# Patient Record
Sex: FEMALE | Race: WHITE | NOT HISPANIC OR LATINO | URBAN - METROPOLITAN AREA
[De-identification: names, ages, dates, MRNs, and addresses within clinical notes are randomized per-mention and may not be internally consistent; named-entity substitution may affect disease eponyms.]

---

## 2018-03-01 ENCOUNTER — OFFICE (AMBULATORY)
Dept: URBAN - METROPOLITAN AREA CLINIC 64 | Facility: CLINIC | Age: 63
End: 2018-03-01
Payer: COMMERCIAL

## 2018-03-01 VITALS
HEART RATE: 75 BPM | WEIGHT: 262 LBS | DIASTOLIC BLOOD PRESSURE: 68 MMHG | HEIGHT: 65 IN | SYSTOLIC BLOOD PRESSURE: 115 MMHG

## 2018-03-01 DIAGNOSIS — K59.00 CONSTIPATION, UNSPECIFIED: ICD-10-CM

## 2018-03-01 DIAGNOSIS — R11.2 NAUSEA WITH VOMITING, UNSPECIFIED: ICD-10-CM

## 2018-03-01 DIAGNOSIS — R14.0 ABDOMINAL DISTENSION (GASEOUS): ICD-10-CM

## 2018-03-01 DIAGNOSIS — K21.9 GASTRO-ESOPHAGEAL REFLUX DISEASE WITHOUT ESOPHAGITIS: ICD-10-CM

## 2018-03-01 DIAGNOSIS — R14.2 ERUCTATION: ICD-10-CM

## 2018-03-01 PROCEDURE — 99213 OFFICE O/P EST LOW 20 MIN: CPT | Performed by: NURSE PRACTITIONER

## 2018-03-01 RX ORDER — OMEPRAZOLE 40 MG/1
40 CAPSULE, DELAYED RELEASE ORAL
Qty: 90 | Refills: 4 | Status: COMPLETED
Start: 2018-03-01 | End: 2020-09-17

## 2018-03-01 RX ORDER — POLYETHYLENE GLYCOL 3350 17 G/17G
17 POWDER, FOR SOLUTION ORAL
Qty: 1 | Refills: 11 | Status: COMPLETED
Start: 2018-03-01 | End: 2019-04-04

## 2018-06-01 ENCOUNTER — HOSPITAL ENCOUNTER (OUTPATIENT)
Dept: CARDIOLOGY | Facility: HOSPITAL | Age: 63
Discharge: HOME | End: 2018-06-01
Attending: INTERNAL MEDICINE | Admitting: INTERNAL MEDICINE

## 2018-06-21 ENCOUNTER — HOSPITAL ENCOUNTER (OUTPATIENT)
Dept: CARDIOLOGY | Facility: HOSPITAL | Age: 63
Discharge: HOME | End: 2018-06-21
Attending: INTERNAL MEDICINE | Admitting: INTERNAL MEDICINE

## 2018-07-09 ENCOUNTER — HOSPITAL ENCOUNTER (OUTPATIENT)
Dept: CARDIOLOGY | Facility: HOSPITAL | Age: 63
Discharge: HOME | End: 2018-07-09

## 2018-08-16 ENCOUNTER — HOSPITAL ENCOUNTER (OUTPATIENT)
Dept: PREADMISSION TESTING | Facility: HOSPITAL | Age: 63
Discharge: HOME | End: 2018-08-16
Attending: INTERNAL MEDICINE | Admitting: INTERNAL MEDICINE

## 2018-08-16 LAB
ANION GAP SERPL CALC-SCNC: 11 MMOL/L (ref 10–20)
APTT BLD: 24.9 SEC (ref 24–31)
BASOPHILS # BLD AUTO: 0 10*3/UL (ref 0–0.2)
BASOPHILS NFR BLD AUTO: 1 % (ref 0–2)
BUN SERPL-MCNC: 15 MG/DL (ref 8–20)
BUN/CREAT SERPL: 15 (ref 5.4–26.2)
CALCIUM SERPL-MCNC: 9.1 MG/DL (ref 8.9–10.3)
CHLORIDE SERPL-SCNC: 100 MMOL/L (ref 101–111)
CONV CO2: 31 MMOL/L (ref 22–32)
CREAT UR-MCNC: 1 MG/DL (ref 0.4–1)
DIFFERENTIAL METHOD BLD: (no result)
EOSINOPHIL # BLD AUTO: 0.4 10*3/UL (ref 0–0.3)
EOSINOPHIL # BLD AUTO: 6 % (ref 0–3)
ERYTHROCYTE [DISTWIDTH] IN BLOOD BY AUTOMATED COUNT: 18.1 % (ref 11.5–14.5)
GLUCOSE SERPL-MCNC: 205 MG/DL (ref 65–99)
HCT VFR BLD AUTO: 36.5 % (ref 35–49)
HGB BLD-MCNC: 11.7 G/DL (ref 12–15)
INR PPP: 1
LYMPHOCYTES # BLD AUTO: 1.6 10*3/UL (ref 0.8–4.8)
LYMPHOCYTES NFR BLD AUTO: 20 % (ref 18–42)
MCH RBC QN AUTO: 25.7 PG (ref 26–32)
MCHC RBC AUTO-ENTMCNC: 32 G/DL (ref 32–36)
MCV RBC AUTO: 80.3 FL (ref 80–94)
MONOCYTES # BLD AUTO: 0.5 10*3/UL (ref 0.1–1.3)
MONOCYTES NFR BLD AUTO: 7 % (ref 2–11)
NEUTROPHILS # BLD AUTO: 5.2 10*3/UL (ref 2.3–8.6)
NEUTROPHILS NFR BLD AUTO: 66 % (ref 50–75)
NRBC BLD AUTO-RTO: 0 /100{WBCS}
NRBC/RBC NFR BLD MANUAL: 0 10*3/UL
PLATELET # BLD AUTO: 208 10*3/UL (ref 150–450)
PMV BLD AUTO: 8.3 FL (ref 7.4–10.4)
POTASSIUM SERPL-SCNC: 4 MMOL/L (ref 3.6–5.1)
PROTHROMBIN TIME: 10.4 SEC (ref 9.6–11.7)
RBC # BLD AUTO: 4.54 10*6/UL (ref 4–5.4)
SODIUM SERPL-SCNC: 138 MMOL/L (ref 136–144)
WBC # BLD AUTO: 7.8 10*3/UL (ref 4.5–11.5)

## 2018-10-30 ENCOUNTER — HOSPITAL ENCOUNTER (OUTPATIENT)
Dept: ORTHOPEDIC SURGERY | Facility: CLINIC | Age: 63
Discharge: HOME | End: 2018-10-30
Attending: PHYSICIAN ASSISTANT | Admitting: PHYSICIAN ASSISTANT

## 2018-12-26 ENCOUNTER — HOSPITAL ENCOUNTER (OUTPATIENT)
Dept: MRI IMAGING | Facility: HOSPITAL | Age: 63
Discharge: HOME | End: 2018-12-26
Attending: PHYSICIAN ASSISTANT | Admitting: PHYSICIAN ASSISTANT

## 2019-01-09 ENCOUNTER — HOSPITAL ENCOUNTER (OUTPATIENT)
Dept: MRI IMAGING | Facility: HOSPITAL | Age: 64
Discharge: HOME | End: 2019-01-09
Attending: PHYSICIAN ASSISTANT | Admitting: PHYSICIAN ASSISTANT

## 2019-01-15 ENCOUNTER — HOSPITAL ENCOUNTER (OUTPATIENT)
Dept: ORTHOPEDIC SURGERY | Facility: CLINIC | Age: 64
Discharge: HOME | End: 2019-01-15
Attending: PHYSICIAN ASSISTANT | Admitting: PHYSICIAN ASSISTANT

## 2019-01-16 ENCOUNTER — HOSPITAL ENCOUNTER (OUTPATIENT)
Dept: PAIN MEDICINE | Facility: HOSPITAL | Age: 64
Discharge: HOME | End: 2019-01-16
Attending: ANESTHESIOLOGY | Admitting: ANESTHESIOLOGY

## 2019-01-25 ENCOUNTER — HOSPITAL ENCOUNTER (OUTPATIENT)
Dept: GENERAL RADIOLOGY | Facility: HOSPITAL | Age: 64
Discharge: HOME | End: 2019-01-25
Attending: PHYSICIAN ASSISTANT | Admitting: PHYSICIAN ASSISTANT

## 2019-02-07 ENCOUNTER — HOSPITAL ENCOUNTER (OUTPATIENT)
Dept: PAIN MEDICINE | Facility: HOSPITAL | Age: 64
Discharge: HOME | End: 2019-02-07
Attending: ANESTHESIOLOGY | Admitting: ANESTHESIOLOGY

## 2019-04-03 ENCOUNTER — HOSPITAL ENCOUNTER (OUTPATIENT)
Dept: ORTHOPEDIC SURGERY | Facility: CLINIC | Age: 64
Discharge: HOME | End: 2019-04-03
Attending: PODIATRIST | Admitting: PODIATRIST

## 2019-04-04 ENCOUNTER — OFFICE (AMBULATORY)
Dept: URBAN - METROPOLITAN AREA CLINIC 64 | Facility: CLINIC | Age: 64
End: 2019-04-04
Payer: COMMERCIAL

## 2019-04-04 ENCOUNTER — HOSPITAL ENCOUNTER (OUTPATIENT)
Dept: ORTHOPEDIC SURGERY | Facility: CLINIC | Age: 64
Discharge: HOME | End: 2019-04-04
Attending: PHYSICIAN ASSISTANT | Admitting: PHYSICIAN ASSISTANT

## 2019-04-04 VITALS — HEIGHT: 65 IN

## 2019-04-04 DIAGNOSIS — R13.19 OTHER DYSPHAGIA: ICD-10-CM

## 2019-04-04 PROCEDURE — 99213 OFFICE O/P EST LOW 20 MIN: CPT | Performed by: NURSE PRACTITIONER

## 2019-04-04 RX ORDER — OMEPRAZOLE 40 MG/1
40 CAPSULE, DELAYED RELEASE ORAL
Qty: 90 | Refills: 4 | Status: COMPLETED
Start: 2018-03-01 | End: 2020-09-17

## 2019-04-10 ENCOUNTER — ON CAMPUS - OUTPATIENT (AMBULATORY)
Dept: URBAN - METROPOLITAN AREA HOSPITAL 2 | Facility: HOSPITAL | Age: 64
End: 2019-04-10
Payer: COMMERCIAL

## 2019-04-10 ENCOUNTER — OFFICE (AMBULATORY)
Dept: URBAN - METROPOLITAN AREA PATHOLOGY 4 | Facility: PATHOLOGY | Age: 64
End: 2019-04-10
Payer: COMMERCIAL

## 2019-04-10 VITALS
HEART RATE: 81 BPM | TEMPERATURE: 97.4 F | SYSTOLIC BLOOD PRESSURE: 121 MMHG | OXYGEN SATURATION: 96 % | HEART RATE: 78 BPM | RESPIRATION RATE: 16 BRPM | SYSTOLIC BLOOD PRESSURE: 125 MMHG | RESPIRATION RATE: 20 BRPM | WEIGHT: 263.8 LBS | OXYGEN SATURATION: 98 % | DIASTOLIC BLOOD PRESSURE: 67 MMHG | RESPIRATION RATE: 18 BRPM | DIASTOLIC BLOOD PRESSURE: 82 MMHG | OXYGEN SATURATION: 100 % | HEIGHT: 65 IN | OXYGEN SATURATION: 99 % | DIASTOLIC BLOOD PRESSURE: 63 MMHG | HEART RATE: 77 BPM | SYSTOLIC BLOOD PRESSURE: 146 MMHG | SYSTOLIC BLOOD PRESSURE: 162 MMHG | DIASTOLIC BLOOD PRESSURE: 86 MMHG | HEART RATE: 82 BPM | OXYGEN SATURATION: 97 % | DIASTOLIC BLOOD PRESSURE: 80 MMHG | HEART RATE: 84 BPM

## 2019-04-10 DIAGNOSIS — R13.19 OTHER DYSPHAGIA: ICD-10-CM

## 2019-04-10 LAB
GI HISTOLOGY: A. SELECT: (no result)
GI HISTOLOGY: PDF REPORT: (no result)

## 2019-04-10 PROCEDURE — 43450 DILATE ESOPHAGUS 1/MULT PASS: CPT | Performed by: INTERNAL MEDICINE

## 2019-04-10 PROCEDURE — 43239 EGD BIOPSY SINGLE/MULTIPLE: CPT | Performed by: INTERNAL MEDICINE

## 2019-04-10 PROCEDURE — 88305 TISSUE EXAM BY PATHOLOGIST: CPT | Performed by: INTERNAL MEDICINE

## 2019-04-11 ENCOUNTER — HOSPITAL ENCOUNTER (OUTPATIENT)
Dept: PAIN MEDICINE | Facility: HOSPITAL | Age: 64
Discharge: HOME | End: 2019-04-11
Attending: ANESTHESIOLOGY | Admitting: ANESTHESIOLOGY

## 2019-05-24 ENCOUNTER — HOSPITAL ENCOUNTER (OUTPATIENT)
Dept: PAIN MEDICINE | Facility: HOSPITAL | Age: 64
Discharge: HOME | End: 2019-05-24
Attending: ANESTHESIOLOGY | Admitting: ANESTHESIOLOGY

## 2019-05-24 ENCOUNTER — CONVERSION ENCOUNTER (OUTPATIENT)
Dept: PAIN MEDICINE | Facility: CLINIC | Age: 64
End: 2019-05-24

## 2019-06-04 VITALS
HEART RATE: 72 BPM | WEIGHT: 260 LBS | SYSTOLIC BLOOD PRESSURE: 167 MMHG | HEIGHT: 64 IN | OXYGEN SATURATION: 72 % | BODY MASS INDEX: 44.39 KG/M2 | RESPIRATION RATE: 16 BRPM | DIASTOLIC BLOOD PRESSURE: 84 MMHG

## 2019-06-06 NOTE — PROGRESS NOTES
HPI: CC Lower back pain    62-year-old female with lumbar radiculitis, S/P  ACDF of C4-C7 corpectomy of C6 2019, Dr. Coburn, here for follow-up after repeat LESI.    Reports 70% relief  Chronic back pain radiating to bilateral lower extremity with burning tingling numbness in both legs worse with standing walking or activity.    Chronic  neck pain radiating to bilateral shoulder bilateral upper extremity.  Mild difficulty with balance and fine motor skills, denies bladder or bowel incontinence.      Tried physical therapy without relief   tried NSAID/tylenol/muscle relaxer with marginal relief   utilizes Lyrica by PCP with mild relief.      L-spine MRI 2018: Severe spinal stenosis at L2-L3, L3-L4, and L4-L5. Neural foraminal narrowing with varying degrees of nerve root impingement    C-spine x-ray 2019: Postsurgical changes of the cervical spine from C4 through C7 .  Hardware appears intact.  Satisfactory vertebral body alignment    Referring MD: JOSE MANUEL Bermudez  Age: 63 Years Old  Sex: Female  Race: White    Pain Assessment   Location of Pain: Neck, R Shoulder, L Shoulder, R Wrist/Arm, L Wrist/Arm, Lower Back, R Hip, L Hip, R Leg, L Leg  Description of Pain: Dull/Aching, Throbbing, Pins & Needles, Stabbing  Previous Pain Rating : 6  Current Pain Ratin  Aggravating Factors: Activity  Alleviating Factors: Rest  Previous Treatments: Epidural Steroids, Narcotic Pain Medication, Physical Therapy  Previous Diagnostic Studies: X-Ray, MRI  Last Dose Pain medicine No narcotic meds  Have your bowel habits changed since you started taking pain medication? No  Comments: takes stool softner  Epidural History Epidurals, last one helped with sciatica      Past Medical History:     Reviewed history from 2019 and no changes required:        Arthritis        Diabetes, Type 2        Hypertension        Obesity        Hx: Sinus Arrhythmia         NURIA - Moderate to severe        Cellulitis : left leg / 2018         Calcium Score 18 2716        Coronary Heart Disease        Pinched nerve         Neck surgery 19    Past Surgical History:     Reviewed history from 2019 and no changes required:        Eye Surgery Right Eye -         Carpal Tunnel Release  Right / Left         Tonsillectomy        Knee Arthroscopy         left knee replacement 2016        Rt knee replacement 2017        Lap Band / Followed by Band Removal         Heart Catherization: BHF -18- significant epicardial coronary artery calcifications with only limited luminal compromise involving a small obtuse marginal branch. Medical management at this time:         post op 19 ACDF C4-C7, sabine C6 - Dr Woodson    Family History Summary:      Reviewed history Last on 2019 and no changes required:2019  Brother - Has Family History of Hypertension - Brother has HTN  - Entered On: 2015  Mother - Has Family History of Heart Disease - Mother had PCI / stents  - Entered On: 2015    General Comments - FH:   denies family history of neck and back problems    Social History:     Reviewed history from 2019 and no changes required:        Marital Status:         Children: 5 -         Occupation: Employed         Passive smoke exposure - no        Alcohol Use - no        Drug Use - no        HIV/High Risk - no        Smoking History:        Patient is a former smoker.        Vital Signs:    Patient Profile:    63 Years Old Female  CC:         Lower back  Height:     64 inches  Weight:     260 pounds  BMI:        44.62     O2 Sat:     72 %  Temp:       98.3 degrees F  Pulse rate: 72 / minute  Resp:       16 per minute  BP Sittin / 84    Patient has a risk of falls? No    Problems: Active problems were reviewed with the patient during this visit.  Medications: Medications were reviewed with the patient during this visit.  Allergies: Allergies were reviewed with the patient during this visit.        Vitals Entered By:  Lori Ang (May 24, 2019 10:55 AM)      Risk Factors:     Smoked Tobacco Use:  Former smoker     Cigarettes:  Yes        Years Since Last Quit:  34    Previous Tobacco Use: Signed On - 04/11/2019  Smoked Tobacco Use:  Former smoker     Cigarettes:  Yes -- 1 pack(s) per day,      Year started:  age 21         Year quit:  1985        Years Since Last Quit:  34 years, 4 months, 23 days  Smokeless Tobacco Use:  Never  Passive smoke exposure:  no  Drug use:  no  HIV high-risk behavior:  no  Caffeine use:  4+ drinks per day    Previous Alcohol Use: Signed On - 04/04/2019  Alcohol use:  no  Exercise:  no  Seatbelt use:  100 %  Sun Exposure:  occasionally    Family History Risk Factors:     Family History of MI in females < 65 years old:  no     Family History of MI in males < 55 years old:  no    Colonoscopy History:     Date of Last Colonoscopy:  01/01/2010    Mammogram History:     Date of Last Mammogram:  01/01/2015          Review of Systems        See HPI    General       Denies fever/chills, fatigue and sleep problems.    Eyes       Denies blurry vision and double vision.    ENT       Denies decreased hearing, sore throat and ears ringing.    CV       Denies chest pain and fainting.    Resp       Denies shortness of breath and cough.    GI       Denies heartburn, constipation, nausea, vomiting and diarrhea.           Denies pain on urination, incontinence and increased frequency.    MS        back pain, bilateral upper extremity pain, bilateral lower extremity pain, neck pain    Derm       Denies rash and itching.    Neuro       Denies numbness, tingling, loss of balance and history of seizures.    Psych       Denies anxiety and depression.    Endo       Denies weight change and thirsty all the time.    Heme       Denies easy bruising, bleeding and enlarged lymph nodes.      Physical Exam   General  General appearance: obese,  no acute distress  Gait and station: antalgic    Mental Status Exam   Mental Status:  AAO x3  Behavior: Appropriate    Cervical   ROM decreased w/ lateral rotation  Spurling's Test Positive  Arm: Right  Palpation: Tender  Paracervical, Trapezius/LS    Thoracolumbar   ROM: decreased rotation/extension  Markus's Test: Negative  Straight Leg Raise: Positive  Radiculopathy: Right, Left  Palpation: Tender  Facets, Paravertebral    Muscle Stretch Reflex   Sensory Intact to light touch/pin prick      EXAM:   Eyes:      Clear conjunctivae,      Pupils rounds and reactive  ENT:      Clear oropharynx,       Mucosa moist/pink       Nose: no deformity  Chest Wall:      Non tender      No deformities or masses noted.    Respiratory:      Clear bilaterally to auscultation.        No dyspnea  Heart:      Regular rate and rhythm, no murmurs, rubs, or gallops   Pulses:      Pulses 2+, symmetric  Extremities:      No clubbing, cyanosis, edema, or deformity noted   Neurologic:      No focal deficits      Coordination intact with rapid alternating movement.  Skin:      Intact without lesions or rashes.  No mass  Cervical Nodes:      No adenopathy noted.      Global pain scale and PHQ-9 on chart                    opioid risk tool low risk    Neuro   Reflexes: R Arm 1+  L arm 2+  R Leg 2+  L Leg 2+    Strength: Arms Normal  Strength: Legs Normal        EXAM:     Total Score Risk Category   Low Risk 0 - 3   Moderate Risk 4 - 7   High Risk > 8     Assessment   Status of Existing Problems:  Assessed Spondylosis, lumbar, without myelopathy as deteriorated - Zenaida Bermudez MD  Assessed Spinal stenosis, lumbar region with neurogenic claudication as unchanged - Zenaida Bermudez MD  Assessed Lumbar radiculitis as improved - Zenaida Bermudez MD  New Problems:  Postlaminectomy syndrome, cervical region (ICD-722.81) (KDG63-A33.1)  Chronic pain (ICD-338.29) (OOT18-M32.29)    Comments:   62-year-old female with lumbar radiculitis, S/P  ACDF of C4-C7 corpectomy of C6 2/2019, Dr. Coburn, here for follow-up after repeat  LESI..   reports 70% relief.   chronic pain from lumbar DDD spondylosis radicular pain, chronic neck pain from DDD/post-laminectomy syndrome.    Back/LSO  brace ordered  to support weeks paraspinal muscles from DDD spondylosis.    RTC as needed for repeat LESI.    Keep follow-up with Ortho Spine     RTC as needed    Plan   Updated Medication List:   DICLOFENAC SODIUM 75 MG ORAL TABLET DELAYED RELEASE (DICLOFENAC SODIUM) 1 po q 12 hours  OMEPRAZOLE 40 MG ORAL CAPSULE DELAYED RELEASE (OMEPRAZOLE)   JARDIANCE 25 MG ORAL TABLET (EMPAGLIFLOZIN) Take 1 tablet by mouth daily  METFORMIN HCL  MG ORAL TABLET EXTENDED RELEASE 24 HOUR (METFORMIN HCL) Take 2 tablets by mouth twice  daily  JANUVIA 100 MG ORAL TABLET (SITAGLIPTIN PHOSPHATE) Take 1 tablet by mouth daily  SERTRALINE HCL 50 MG ORAL TABLET (SERTRALINE HCL) Take 1 tablet by mouth daily  LYRICA 100 MG ORAL CAPSULE (PREGABALIN) Take one (1) tablet by mouth twice a day  ISOSORBIDE MONONITRATE ER 30 MG ORAL TABLET EXTENDED RELEASE 24 HOUR (ISOSORBIDE MONONITRATE) Take once a day by mouth.  GLIPIZIDE TABLET (GLIPIZIDE TABS)   LISINOPRIL-HYDROCHLOROTHIAZIDE 20-12.5 MG ORAL TABLET (LISINOPRIL-HYDROCHLOROTHIAZIDE) Take 1 tablet by mouth daily    New Orders:   Ofc Vst, Est Level IV [[id number]]        Patient Instructions:  1)  Discussed importance of regular exercise and recommended starting or continuing a regular exercise program for good health.  2)  The patient was encouraged to lose weight for better health.    ]      Electronically signed by Zenaida Bermudez MD on 05/24/2019 at 11:43 AM  ________________________________________________________________________       Disclaimer: Converted Note message may not contain all data elements that existed in the legacy source system. Please see [employer] for the original note details.

## 2019-06-28 RX ORDER — DICLOFENAC SODIUM 75 MG/1
75 TABLET, DELAYED RELEASE ORAL 2 TIMES DAILY PRN
Qty: 180 TABLET | Refills: 0 | Status: SHIPPED | OUTPATIENT
Start: 2019-06-28 | End: 2019-09-26

## 2019-06-28 RX ORDER — DICLOFENAC SODIUM 75 MG/1
75 TABLET, DELAYED RELEASE ORAL 2 TIMES DAILY PRN
COMMUNITY
Start: 2019-04-04 | End: 2019-06-28 | Stop reason: SDUPTHER

## 2019-07-03 ENCOUNTER — OFFICE VISIT (OUTPATIENT)
Dept: ORTHOPEDIC SURGERY | Facility: CLINIC | Age: 64
End: 2019-07-03

## 2019-07-03 VITALS
BODY MASS INDEX: 45.24 KG/M2 | HEART RATE: 79 BPM | DIASTOLIC BLOOD PRESSURE: 74 MMHG | WEIGHT: 265 LBS | SYSTOLIC BLOOD PRESSURE: 123 MMHG | HEIGHT: 64 IN

## 2019-07-03 DIAGNOSIS — M47.26 OTHER SPONDYLOSIS WITH RADICULOPATHY, LUMBAR REGION: ICD-10-CM

## 2019-07-03 DIAGNOSIS — G47.30 SLEEP APNEA, UNSPECIFIED TYPE: ICD-10-CM

## 2019-07-03 DIAGNOSIS — M54.2 NECK PAIN: Primary | ICD-10-CM

## 2019-07-03 PROCEDURE — 99213 OFFICE O/P EST LOW 20 MIN: CPT | Performed by: PHYSICIAN ASSISTANT

## 2019-07-03 RX ORDER — MOMETASONE FUROATE 50 UG/1
SPRAY, METERED NASAL
COMMUNITY
Start: 2015-12-17 | End: 2020-06-29

## 2019-07-03 RX ORDER — METHYLPREDNISOLONE 4 MG/1
21 TABLET ORAL DAILY
Qty: 21 TABLET | Refills: 0 | Status: SHIPPED | OUTPATIENT
Start: 2019-07-03 | End: 2019-10-31

## 2019-07-03 RX ORDER — METFORMIN HYDROCHLORIDE 500 MG/1
1000 TABLET, EXTENDED RELEASE ORAL 2 TIMES DAILY
COMMUNITY
Start: 2016-05-06

## 2019-07-03 RX ORDER — HYDROCODONE BITARTRATE AND ACETAMINOPHEN 5; 325 MG/1; MG/1
TABLET ORAL
COMMUNITY
Start: 2016-05-02 | End: 2019-10-31

## 2019-07-03 RX ORDER — OMEPRAZOLE 40 MG/1
40 CAPSULE, DELAYED RELEASE ORAL 2 TIMES DAILY
COMMUNITY
Start: 2019-06-28

## 2019-07-03 RX ORDER — DICLOFENAC SODIUM 75 MG/1
TABLET, DELAYED RELEASE ORAL
COMMUNITY
Start: 2019-04-04 | End: 2019-10-31

## 2019-07-03 RX ORDER — LISINOPRIL AND HYDROCHLOROTHIAZIDE 20; 12.5 MG/1; MG/1
1 TABLET ORAL DAILY
COMMUNITY
Start: 2015-02-02

## 2019-07-03 RX ORDER — SITAGLIPTIN 100 MG/1
100 TABLET, FILM COATED ORAL DAILY
COMMUNITY
Start: 2019-06-28

## 2019-07-03 RX ORDER — ISOSORBIDE MONONITRATE 30 MG/1
30 TABLET, EXTENDED RELEASE ORAL DAILY
COMMUNITY
Start: 2019-06-10 | End: 2020-06-23 | Stop reason: SDUPTHER

## 2019-07-03 RX ORDER — SUCRALFATE 1 G/1
1 TABLET ORAL 4 TIMES DAILY
COMMUNITY
Start: 2015-10-09 | End: 2019-10-31

## 2019-07-03 RX ORDER — PREGABALIN 100 MG/1
100 CAPSULE ORAL DAILY
COMMUNITY
Start: 2017-01-16

## 2019-07-03 NOTE — PROGRESS NOTES
Orthopedic Spine Post Operative Note      Primary Care Provider: Luz Maria Gallardo APRN    Patient Name: Nohemi Mesa  Patient Age: 63 y.o.  Chief Complaint: had concerns including Follow-up of the Cervical Spine and Follow-up (2/22/19 ACDF C4-C7, sabine C6).    History of Present Illness: Nohemi Mesa is a 63 y.o. year old female here for a half months status post ACDF C4-C7 for pregnancy 6.  Doing very well from surgery.  Is also numbness occasional burning in the cervical thoracic region posteriorly around the spinous process of C7.  Her chief complaint today is low back pain.  He had lumbar degeneration with retinopathy and has had some lumbar epidural injections.  Does not help.  Left quite a bit but she had a dramatic increase in low back pain the last couple of weeks but no proceeding trauma.  He states better with sitting up will be to inject her back, heat, diclofenac.  He also takes Lyrica which definitely helps her leg pain which there.  He is extremely miserable with back pain right now nothing seems to make it go away.  She sees a chiropractor and has been undergoing some active puncture.    She is also been expensing significant sleepiness throughout the day.  Falling asleep talking to people.  She been diagnosed with sleep apnea, but does not use her CPAP regularly as it does not fit her properly.  She does not have good follow-up with her CPAP.  She has referral to the pulmonologist  but they cannot see her until October.    She would like to have surgery on her lower back but at this time it is not practical for her.  She is also attempting to lose significant weight prior to surgery.    Imaging:  AP lateral cervical x-ray shows mentation intact without any loosening.    Assessment:   1. Neck pain    2. Other spondylosis with radiculopathy, lumbar region    3. Sleep apnea, unspecified type        Plan:  We will send in a per scription for Medrol Dosepak.  She is going to cut out all starches  "and sugars from her diet while on Medrol Dosepak to control her blood sugar.  We will also send referral to neurology for evaluation and treatment of sleep apnea.  Follow-up here in 3 months at which point time will begin to do some planning for lumbar surgery.    Objective   /74 (BP Location: Right arm, Patient Position: Sitting, Cuff Size: Large Adult)   Pulse 79   Ht 162.6 cm (64\")   Wt 120 kg (265 lb)   BMI 45.49 kg/m²     CONSTITUTIONAL: well nourished, well-developed, alert, oriented, in no acute distress   COMMUNICATION AND VOICE: able to communicate normally, normal voice quality  HEAD: normocephalic, atraumatic, no tenderness,  FACE: appearance normal,  facial motion symmetric  CHEST/RESPIRATORY: normal respiratory effort   CARDIOVASCULAR: no cyanosis or edema   SKIN: Incision is well healed.no edema, induration, fluctuance  MUSCULOSKELETAL: body movement grossly normal, normal gait and station NEUROLOGICAL/PSYCHIATRIC: oriented appropriately for age, mood normal, affect appropriate      Physical Exam      Incision is well healed.no edema, induration, fluctuance  1. Neck pain    2. Other spondylosis with radiculopathy, lumbar region    3. Sleep apnea, unspecified type        Orders Placed This Encounter   Procedures   • XR Spine Cervical 2 View     Scheduling Instructions:      rm 21      [facility]pine ap/lat       2/22/19 ACDF C4-C7, sabine C6      3:00     Order Specific Question:   Reason for Exam:     Answer:   neck pain   • Ambulatory Referral to Neurology     Referral Priority:   Routine     Referral Type:   Consultation     Referral Reason:   Specialty Services Required     Referred to Provider:   Seipel, Joseph F, MD     Requested Specialty:   Neurology     Number of Visits Requested:   1            "

## 2019-08-27 ENCOUNTER — OFFICE VISIT (OUTPATIENT)
Dept: NEUROLOGY | Facility: CLINIC | Age: 64
End: 2019-08-27

## 2019-08-27 VITALS
HEIGHT: 64 IN | BODY MASS INDEX: 46.06 KG/M2 | DIASTOLIC BLOOD PRESSURE: 75 MMHG | WEIGHT: 269.8 LBS | SYSTOLIC BLOOD PRESSURE: 137 MMHG

## 2019-08-27 DIAGNOSIS — G47.33 OBSTRUCTIVE SLEEP APNEA SYNDROME: Primary | ICD-10-CM

## 2019-08-27 PROBLEM — E66.9 OBESITY: Status: ACTIVE | Noted: 2019-08-27

## 2019-08-27 PROCEDURE — 99243 OFF/OP CNSLTJ NEW/EST LOW 30: CPT | Performed by: PSYCHIATRY & NEUROLOGY

## 2019-08-27 NOTE — PROGRESS NOTES
Sleep Medicine initial Consultation    Nohemi Mesa  : 1955  64 y.o. female   Date of Service: 2019  Referring provider: SOHAIL Simon    New sleep referred by SOHAIL Simon  Neck measures 19 inches patient had sleep study done 3 yrs ago at [facility] dx with sleep apnea  Currently not compliant with CPAP therapy patient, was having difficulties with her mask. Had much pain contributing to her sleep problems.  Patient is having chronic fatigue. Patient uses a nasal mask with a chin strap and goes through [employer] for supplies. Patient needs to be evaluated and needs supplies.     History Of Present Illness:   Mrs. Nohemi Mesa  is a 64 y.o. right handed non-  female patient has a H/O HTN, Arthritis, GERD is here for the evaluation of Excessive Daytime Sleepiness, Sleep Apnea, Chronic Fatigue and Restless Leg Syndrome.     The patient c/o excessive daytime sleepiness, , chronic fatigue, difficulty driving to to sleepiness and There is no H/O sleep paralysis, hypnagogic hallucinations or cataplexy..      The patient complains of snoring has witnessed episodes of sleep apnea, has dry mouth or sore mouth when he wakes up and has gained 40 lbs of weight the the last 5 years.    The patient complains of leg jerking during sleep. Pt has neuropathy in legs    The patient complains of difficulty falling asleep, frequent awakenings 1-3, Does not feel rested even after a long sleep and Still feels sleepy even when increasing sleep time.    The patient reports history of There is no h/O sleepwalking or bedwetting or nightmares or sleep eating or acting out dreams    Sleep schedule: Bedtime:8:30 , gets out of bed at 2:30, sleep latency: 20 minutes, Gets about 6-7 hours of sleep.    [place] Sleepiness Scale score:   EPWORTH SLEEPINESS SCALE  Sitting and reading  3  WatchingTV  3  Sitting, inactive, in a public place  3  As a passenger in a car for 1 hour w/o a break  3  Lying down to rest in the  afternoon  3  Sitting and talking to someone  3  Sitting quietly after a lunch  3  In a car, while stopped for traffic or a light  3  Total 24 24/24.  Past Medical History:   Diagnosis Date   • Arthritis    • Cellulitis of left leg    • Coronary heart disease    • H/O neck surgery 02/22/2019   • History of type 2 diabetes mellitus    • Hypertension    • Obesity    • NURIA (obstructive sleep apnea)     MODERATE TO SEVERE   • Pinched nerve    • Sinus arrhythmia      Past Surgical History:   Procedure Laterality Date   • CARDIAC CATHETERIZATION  08/17/2018    [facility] 08/17/2018 SIGNIFCANT EPICARDIAL CORONARY ARTERY CALCIFICATIONS WITH ONLY LIMITED LUMINAL COMPROMISE INVOLVING A SMALL OBTUSE MARGINAL BRANCH.  MEDICAL MANAGEMENT AT THIS TIME   • CARPAL TUNNEL RELEASE      RIGHT/LEFT   • EYE SURGERY Right    • KNEE ARTHROSCOPY     • LAPAROSCOPIC GASTRIC BANDING      2008/FOLLOWED BY BAND REMOVAL 2011   • OTHER SURGICAL HISTORY      POST OP 02/22/2019  ACDF  C4-C7, VALE C6, DR STOREY   • TONSILLECTOMY     • TOTAL KNEE ARTHROPLASTY Left 2016   • TOTAL KNEE ARTHROPLASTY Right 2017     Current Outpatient Medications on File Prior to Visit   Medication Sig Dispense Refill   • diclofenac (VOLTAREN) 75 MG EC tablet Take 1 tablet by mouth 2 (Two) Times a Day As Needed (pain) for up to 90 days. 180 tablet 0   • diclofenac (VOLTAREN) 75 MG EC tablet DICLOFENAC SODIUM 75 MG TBEC     • HYDROcodone-acetaminophen (NORCO) 5-325 MG per tablet      • isosorbide mononitrate (IMDUR) 30 MG 24 hr tablet      • JANUVIA 100 MG tablet      • lisinopril-hydrochlorothiazide (PRINZIDE,ZESTORETIC) 20-12.5 MG per tablet TAKE 1 TABLET DAILY     • metFORMIN ER (GLUCOPHAGE-XR) 500 MG 24 hr tablet Take 1,000 mg by mouth Daily.     • methylPREDNISolone (MEDROL, RENE,) 4 MG tablet Take 21 tablets by mouth Daily. Take as directed on package instructions. 21 tablet 0   • mometasone (NASONEX) 50 MCG/ACT nasal spray      • omeprazole (priLOSEC) 40 MG capsule       • pregabalin (LYRICA) 100 MG capsule TAKE ONE CAPSULE BY MOUTH TWICE A DAY     • sucralfate (CARAFATE) 1 g tablet Take 1 g by mouth 4 (Four) Times a Day.       No current facility-administered medications on file prior to visit.      No Known Allergies  Family History   Problem Relation Age of Onset   • Heart disease Mother         PCI/STENTS   • Hypertension Brother      Social History     Socioeconomic History   • Marital status:      Spouse name: Not on file   • Number of children: Not on file   • Years of education: Not on file   • Highest education level: Not on file   Tobacco Use   • Smoking status: Former Smoker   • Smokeless tobacco: Never Used   Substance and Sexual Activity   • Alcohol use: No     Frequency: Never   • Drug use: No   • Sexual activity: Defer     Review of Systems   Constitutional: Positive for fatigue. Negative for appetite change.   HENT: Positive for hearing loss. Negative for congestion, sinus pressure and sinus pain.    Eyes: Negative for pain and itching.   Respiratory: Positive for shortness of breath. Negative for cough.    Cardiovascular: Negative for chest pain and palpitations.   Gastrointestinal: Negative for constipation and diarrhea.   Endocrine: Negative for cold intolerance and heat intolerance.   Genitourinary: Positive for frequency. Negative for difficulty urinating.   Musculoskeletal: Positive for back pain. Negative for neck pain.   Allergic/Immunologic: Negative for environmental allergies.   Neurological: Positive for light-headedness and numbness. Negative for dizziness, tremors, seizures, syncope, facial asymmetry, speech difficulty, weakness and headaches.   Psychiatric/Behavioral: Negative for agitation and confusion.     I reviewed and addressed ROS entered by MA.    Patient examination:  Vitals:    08/27/19 1624   BP: 137/75    Body mass index is 46.31 kg/m².     Physical Exam   Constitutional: She is oriented to person, place, and time. She appears  well-developed and well-nourished.   HENT:   Head: Normocephalic.   Mouth/Throat: Oropharynx is clear and moist.   Eyes: Conjunctivae and EOM are normal. Pupils are equal, round, and reactive to light.   Cardiovascular: Normal rate, regular rhythm and normal heart sounds.   No murmur heard.  Pulmonary/Chest: Effort normal and breath sounds normal. She has no wheezes.   Musculoskeletal: Normal range of motion. She exhibits no edema or deformity.   Neurological: She is alert and oriented to person, place, and time. No cranial nerve deficit. Coordination normal.   Psychiatric: She has a normal mood and affect. Her behavior is normal.   Vitals reviewed.      ASSESSMENT AND PLAN:The patient has symptoms of obstructive sleep apnea syndrome with hypersomnia.     As the patient has a CPAP machine at home which is only 3 years old she will be fit with a mask here in the office.  She is then to take the mask home and try to sleep with her machine as it is currently set and if successful we will then obtain a download to document adequate compliance and possible need for any pressure adjustment.  If she is not successful using machine as is we will then schedule for any lab split-night study.    Pt is compliant with 83%>4 hours, ahi <2, continue current pressure.       Encounter Diagnosis   Name Primary?   • Obstructive sleep apnea syndrome Yes           Return in about 3 months (around 11/27/2019) for Recheck.    This document has been electronically signed by Joseph Seipel, MD  on August 27, 2019 5:23 PM

## 2019-09-23 ENCOUNTER — OFFICE (AMBULATORY)
Dept: URBAN - METROPOLITAN AREA CLINIC 64 | Facility: CLINIC | Age: 64
End: 2019-09-23
Payer: COMMERCIAL

## 2019-09-23 VITALS
DIASTOLIC BLOOD PRESSURE: 75 MMHG | HEIGHT: 65 IN | SYSTOLIC BLOOD PRESSURE: 137 MMHG | WEIGHT: 273 LBS | HEART RATE: 74 BPM

## 2019-09-23 DIAGNOSIS — R14.0 ABDOMINAL DISTENSION (GASEOUS): ICD-10-CM

## 2019-09-23 DIAGNOSIS — K59.00 CONSTIPATION, UNSPECIFIED: ICD-10-CM

## 2019-09-23 DIAGNOSIS — R10.13 EPIGASTRIC PAIN: ICD-10-CM

## 2019-09-23 DIAGNOSIS — R13.19 OTHER DYSPHAGIA: ICD-10-CM

## 2019-09-23 DIAGNOSIS — R11.2 NAUSEA WITH VOMITING, UNSPECIFIED: ICD-10-CM

## 2019-09-23 DIAGNOSIS — R14.2 ERUCTATION: ICD-10-CM

## 2019-09-23 PROCEDURE — 99213 OFFICE O/P EST LOW 20 MIN: CPT | Performed by: NURSE PRACTITIONER

## 2019-10-01 ENCOUNTER — OFFICE VISIT (OUTPATIENT)
Dept: CARDIOLOGY | Facility: CLINIC | Age: 64
End: 2019-10-01

## 2019-10-01 VITALS
HEIGHT: 64 IN | BODY MASS INDEX: 46.1 KG/M2 | DIASTOLIC BLOOD PRESSURE: 68 MMHG | WEIGHT: 270 LBS | HEART RATE: 82 BPM | SYSTOLIC BLOOD PRESSURE: 106 MMHG | OXYGEN SATURATION: 97 %

## 2019-10-01 DIAGNOSIS — R06.09 DYSPNEA ON EXERTION: Primary | ICD-10-CM

## 2019-10-01 DIAGNOSIS — I25.118 CORONARY ARTERY DISEASE OF NATIVE ARTERY OF NATIVE HEART WITH STABLE ANGINA PECTORIS (HCC): ICD-10-CM

## 2019-10-01 DIAGNOSIS — I10 ESSENTIAL HYPERTENSION: ICD-10-CM

## 2019-10-01 PROCEDURE — 99213 OFFICE O/P EST LOW 20 MIN: CPT | Performed by: INTERNAL MEDICINE

## 2019-10-01 PROCEDURE — 93000 ELECTROCARDIOGRAM COMPLETE: CPT | Performed by: INTERNAL MEDICINE

## 2019-10-01 RX ORDER — EMPAGLIFLOZIN 25 MG/1
TABLET, FILM COATED ORAL DAILY
COMMUNITY
Start: 2019-08-15 | End: 2019-10-31

## 2019-10-01 RX ORDER — GLIPIZIDE 5 MG/1
10 TABLET, FILM COATED, EXTENDED RELEASE ORAL 2 TIMES DAILY
COMMUNITY
Start: 2018-10-30

## 2019-10-01 RX ORDER — FUROSEMIDE 40 MG/1
TABLET ORAL DAILY PRN
COMMUNITY
Start: 2014-12-30

## 2019-10-01 NOTE — PROGRESS NOTES
"Visit Type:  Follow-up Visit- 6 month   Primary Care Provider:  Paulina RICHARD     Chief Complaint:  no cardiac complaints / back surgery in Feb .     History of Present Illness:     Dear Luz Maria     CC:   New symptoms of dyspnea the past 2 months, history of obstructive sleep apnea.  Follow-up for coronary artery disease,  Palpitations.  History of hypertension, diabetes,     Mrs. Nohemi Mesa Is a very pleasant 64 years old lady with no known history of obstructive coronary artery disease ars.  She is a known hypertensive with  diabetes.     She  currently takes Januvia.          Her myocardial perfusion imaging study showed no evidence of inducible myocardial ischemia.  Coronary artery calcium score was very high -- 2716 !!       /68   Pulse 82 Comment: Sinus  Ht 162.6 cm (64\")   Wt 122 kg (270 lb)   SpO2 97%   BMI 46.35 kg/m²      Her cardiac catheterization  in  August of 2018 showed 70% lesion at the ostium of a small obtuse marginal branch and other arteries showed significant calcification but no occlusive disease. PCI at this point was not recommended because  small size of OMB    Indication for EKG coronary artery disease.    EKG showed normal sinus rhythm low precordial voltage and poor R wave progression across precordial leads.  TN interval was 162 ms QRS duration was 90 ms QTC was 444 ms and QRS axis was 54.  EKG was similar to EKG of 2/12/2019    Patient has been experiencing shortness of breath on exertion and complains of some tightness in her chest.  I will increase her isosorbide mononitrate from 30 mg daily to 45 mg daily.  Her blood pressure is on the low side so I cannot increase her dose to 60 mg daily at this time.       She is s/p neck surgery per formed b y Dr. Woodson  and has done very well from cardiac standpoint.    She has seen pulmonary specialist and sleep medicine specialists.  She also is seeing gastroenterologist for esophageal stricture which had been stretched 3 " times.  She also is being considered for having gastroparesis.     A/P  1-   Coronary artery disease.  Stable with no anginal symptoms.    2.  Dyspnea.  Will obtain echocardiogram.     3- We will check lipids and see if she is a candidate for Vascepa for primary/secondary  prevention     Thank you very much for allowing us to participate in the care of your patients                   Problems: Active problems were reviewed with the patient during this visit.  Medications: Medications were reviewed with the patient during this visit.  Allergies: Allergies were reviewed with the patient during this visit.                   Past Medical History:     Reviewed history from 09/04/2018 and no changes required:        Arthritis        Diabetes, Type 2        Hypertension        Obesity        Hx: Sinus Arrhythmia         NURIA - Moderate to severe        Cellulitis : left leg / 2018        Calcium Score 7/9/18 2716        Coronary Heart Disease        Pinched nerve      Past Surgical History:     Reviewed history from 12/11/2018 and no changes required:        Eye Surgery Right Eye -         Carpal Tunnel Release  Right / Left         Tonsillectomy        Knee Arthroscopy         left knee replacement 2016        Rt knee replacement 2017        Lap Band 2008/ Followed by Band Removal 2011        Heart Catherization: [facility] 8-17-18- significant epicardial coronary artery calcifications with only limited luminal compromise involving a small obtuse marginal branch. Medical management at this time:         Back surgery : 2-22-19 Dr. Woodson      Active Medications (reviewed today):  OMEPRAZOLE 40 MG ORAL CAPSULE DELAYED RELEASE (OMEPRAZOLE)   JARDIANCE 25 MG ORAL TABLET (EMPAGLIFLOZIN) Take 1 tablet by mouth daily  METFORMIN HCL  MG ORAL TABLET EXTENDED RELEASE 24 HOUR (METFORMIN HCL) Take 2 tablets by mouth twice  daily  JANUVIA 100 MG ORAL TABLET (SITAGLIPTIN PHOSPHATE) Take 1 tablet by mouth daily  SERTRALINE HCL 50 MG ORAL  TABLET (SERTRALINE HCL) Take 1 tablet by mouth daily  LYRICA 100 MG ORAL CAPSULE (PREGABALIN) Take one (1) tablet by mouth twice a day  ISOSORBIDE MONONITRATE ER 30 MG ORAL TABLET EXTENDED RELEASE 24 HOUR (ISOSORBIDE MONONITRATE) Take once a day by mouth.  FUROSEMIDE 40 MG ORAL TABLET (FUROSEMIDE) Take one by mouth daily- BUT pt. only takes on weekends  ASPIR-LOW 81 MG ORAL TABLET DELAYED RELEASE (ASPIRIN) Take 1 tablet by mouth daily  GLIPIZIDE TABLET (GLIPIZIDE TABS)   LISINOPRIL-HYDROCHLOROTHIAZIDE 20-12.5 MG ORAL TABLET (LISINOPRIL-HYDROCHLOROTHIAZIDE) Take 1 tablet by mouth daily     Current Allergies (reviewed today):  * NKDA (Critical)     Family History Summary:      Reviewed history Last on 02/06/2019 and no changes required:04/02/2019  Brother - Has Family History of Hypertension - Brother has HTN  - Entered On: 7/8/2015  Mother - Has Family History of Heart Disease - Mother had PCI / stents  - Entered On: 7/8/2015     General Comments - FH:   denies family history of neck and back problems     Social History:     Reviewed history from 07/08/2015 and no changes required:        Marital Status:         Children: 5 -         Occupation: Employed         Passive smoke exposure - no        Alcohol Use - no        Drug Use - no        HIV/High Risk - no                Smoking History:        Patient is a former smoker.           Risk Factors:      Smoked Tobacco Use:  Former smoker     Cigarettes:  Yes -- 1 pack(s) per day,      Year started:  age 21         Year quit:  1985        Years Since Last Quit:  34  Smokeless Tobacco Use:  Never  Passive smoke exposure:  no  Drug use:  no  HIV high-risk behavior:  no  Caffeine use:  4+ drinks per day  Alcohol use:  no     Family History Risk Factors:     Family History of MI in females < 65 years old:  no     Family History of MI in males < 55 years old:  no           Review of Systems   General: denies fevers, chills, sweats, anorexia, fatigue, malaise, weight  loss  Eyes: denies blurring, diplopia, irritation, discharge, vision loss, eye pain, photophobia  Cardiovascular:  coronary artery disease.  Diabetes,  Respiratory: Denies cough, dyspnea, excessive sputum, hemoptysis, wheezing  Musculoskeletal:  history of neck surgery  Neurologic: denies transient paralysis, weakness, paresthesias, seizures, syncope, tremors, vertigo  Psychiatric: denies depression, anxiety, memory loss, mental disturbance, suicidal ideation, hallucinations, paranoia        Physical Exam     General:      well developed, well nourished, in no acute distress.    Neck:      no masses, thyromegaly, or abnormal cervical nodes.   no JVD. No carotid bruits  Lungs:      clear bilaterally to auscultation.    Heart:      non-displaced PMI, chest non-tender; regular rate and rhythm, S1, S2 without murmurs, rubs, or gallops    Abdomen soft obese nontender with no organomegaly masses abnormal pulsations or bruits.  Pulses:      pulses normal in all 4 extremities.    Extremities:       no edema    There is no cyanosis or clubbing.  Skin is warm and dry.

## 2019-10-08 ENCOUNTER — TELEPHONE (OUTPATIENT)
Dept: NEUROLOGY | Facility: CLINIC | Age: 64
End: 2019-10-08

## 2019-10-08 NOTE — TELEPHONE ENCOUNTER
Patient is still falling asleep at work and at [employer] and having issues with not able to breathe has to take the mask off to get more air. Patient doing okay with supplies at this time.

## 2019-10-08 NOTE — TELEPHONE ENCOUNTER
Pt is compliant with 83%>4 hours, ahi <2, continue current pressure.     Does she need an order for supplies sent to a dme?

## 2019-10-09 ENCOUNTER — HOSPITAL ENCOUNTER (OUTPATIENT)
Dept: CARDIOLOGY | Facility: HOSPITAL | Age: 64
Discharge: HOME | End: 2019-10-09
Admitting: INTERNAL MEDICINE

## 2019-10-09 VITALS
BODY MASS INDEX: 45.92 KG/M2 | WEIGHT: 268.96 LBS | HEIGHT: 64 IN | HEART RATE: 77 BPM | SYSTOLIC BLOOD PRESSURE: 130 MMHG | DIASTOLIC BLOOD PRESSURE: 61 MMHG

## 2019-10-09 DIAGNOSIS — R06.09 DYSPNEA ON EXERTION: ICD-10-CM

## 2019-10-09 PROCEDURE — 93306 TTE W/DOPPLER COMPLETE: CPT

## 2019-10-11 ENCOUNTER — TELEPHONE (OUTPATIENT)
Dept: NEUROLOGY | Facility: CLINIC | Age: 64
End: 2019-10-11

## 2019-10-11 DIAGNOSIS — G47.33 OBSTRUCTIVE SLEEP APNEA: Primary | ICD-10-CM

## 2019-10-15 LAB
ASCENDING AORTA: 3.8 CM
BH CV ECHO MEAS - ACS: 2.1 CM
BH CV ECHO MEAS - AO MAX PG (FULL): 0.99 MMHG
BH CV ECHO MEAS - AO MAX PG: 13.1 MMHG
BH CV ECHO MEAS - AO MEAN PG (FULL): 1.3 MMHG
BH CV ECHO MEAS - AO MEAN PG: 6.5 MMHG
BH CV ECHO MEAS - AO ROOT AREA (BSA CORRECTED): 1.3
BH CV ECHO MEAS - AO ROOT AREA: 6.5 CM^2
BH CV ECHO MEAS - AO ROOT DIAM: 2.9 CM
BH CV ECHO MEAS - AO V2 MAX: 180.7 CM/SEC
BH CV ECHO MEAS - AO V2 MEAN: 120.7 CM/SEC
BH CV ECHO MEAS - AO V2 VTI: 34.2 CM
BH CV ECHO MEAS - ASC AORTA: 3.8 CM
BH CV ECHO MEAS - AVA(I,A): 3.5 CM^2
BH CV ECHO MEAS - AVA(I,D): 3.5 CM^2
BH CV ECHO MEAS - AVA(V,A): 3.4 CM^2
BH CV ECHO MEAS - AVA(V,D): 3.4 CM^2
BH CV ECHO MEAS - BSA(HAYCOCK): 2.4 M^2
BH CV ECHO MEAS - BSA: 2.2 M^2
BH CV ECHO MEAS - BZI_BMI: 46.3 KILOGRAMS/M^2
BH CV ECHO MEAS - BZI_METRIC_HEIGHT: 162.6 CM
BH CV ECHO MEAS - BZI_METRIC_WEIGHT: 122.5 KG
BH CV ECHO MEAS - EDV(CUBED): 98 ML
BH CV ECHO MEAS - EDV(MOD-SP2): 98.2 ML
BH CV ECHO MEAS - EDV(MOD-SP4): 96.6 ML
BH CV ECHO MEAS - EDV(TEICH): 97.8 ML
BH CV ECHO MEAS - EF(CUBED): 65.1 %
BH CV ECHO MEAS - EF(MOD-BP): 71 %
BH CV ECHO MEAS - EF(MOD-SP2): 70.2 %
BH CV ECHO MEAS - EF(MOD-SP4): 70.3 %
BH CV ECHO MEAS - EF(TEICH): 56.7 %
BH CV ECHO MEAS - ESV(CUBED): 34.2 ML
BH CV ECHO MEAS - ESV(MOD-SP2): 29.2 ML
BH CV ECHO MEAS - ESV(MOD-SP4): 28.7 ML
BH CV ECHO MEAS - ESV(TEICH): 42.4 ML
BH CV ECHO MEAS - FS: 29.6 %
BH CV ECHO MEAS - IVS/LVPW: 1.2
BH CV ECHO MEAS - IVSD: 1.4 CM
BH CV ECHO MEAS - LA DIMENSION(2D): 3.9 CM
BH CV ECHO MEAS - LA DIMENSION: 3.9 CM
BH CV ECHO MEAS - LA/AO: 1.4
BH CV ECHO MEAS - LAT PEAK E' VEL: 9 CM/SEC
BH CV ECHO MEAS - LV DIASTOLIC VOL/BSA (35-75): 43.5 ML/M^2
BH CV ECHO MEAS - LV IVRT: 0.06 SEC
BH CV ECHO MEAS - LV MASS(C)D: 228.1 GRAMS
BH CV ECHO MEAS - LV MASS(C)DI: 102.6 GRAMS/M^2
BH CV ECHO MEAS - LV MAX PG: 12.1 MMHG
BH CV ECHO MEAS - LV MEAN PG: 5.2 MMHG
BH CV ECHO MEAS - LV SYSTOLIC VOL/BSA (12-30): 12.9 ML/M^2
BH CV ECHO MEAS - LV V1 MAX: 173.8 CM/SEC
BH CV ECHO MEAS - LV V1 MEAN: 106.4 CM/SEC
BH CV ECHO MEAS - LV V1 VTI: 33.7 CM
BH CV ECHO MEAS - LVIDD: 4.6 CM
BH CV ECHO MEAS - LVIDS: 3.2 CM
BH CV ECHO MEAS - LVOT AREA: 3.6 CM^2
BH CV ECHO MEAS - LVOT DIAM: 2.1 CM
BH CV ECHO MEAS - LVPWD: 1.2 CM
BH CV ECHO MEAS - MED PEAK E' VEL: 7 CM/SEC
BH CV ECHO MEAS - MV A MAX VEL: 106.7 CM/SEC
BH CV ECHO MEAS - MV DEC SLOPE: 412.2 CM/SEC^2
BH CV ECHO MEAS - MV DEC TIME: 0.22 SEC
BH CV ECHO MEAS - MV E MAX VEL: 89.7 CM/SEC
BH CV ECHO MEAS - MV E/A: 0.84
BH CV ECHO MEAS - MV MAX PG: 4.8 MMHG
BH CV ECHO MEAS - MV MEAN PG: 2.4 MMHG
BH CV ECHO MEAS - MV P1/2T: 51 MSEC
BH CV ECHO MEAS - MV V2 MAX: 109.9 CM/SEC
BH CV ECHO MEAS - MV V2 MEAN: 71.8 CM/SEC
BH CV ECHO MEAS - MV V2 VTI: 27.3 CM
BH CV ECHO MEAS - MVA(P1/2T): 4.3 CM2
BH CV ECHO MEAS - MVA(VTI): 4.4 CM^2
BH CV ECHO MEAS - PA ACC TIME: 0.07 SEC
BH CV ECHO MEAS - PA MAX PG (FULL): 2.9 MMHG
BH CV ECHO MEAS - PA MAX PG: 6 MMHG
BH CV ECHO MEAS - PA MEAN PG (FULL): 1.6 MMHG
BH CV ECHO MEAS - PA MEAN PG: 3.2 MMHG
BH CV ECHO MEAS - PA PR(ACCEL): 48.9 MMHG
BH CV ECHO MEAS - PA V2 MAX: 122.5 CM/SEC
BH CV ECHO MEAS - PA V2 MEAN: 85.2 CM/SEC
BH CV ECHO MEAS - PA V2 VTI: 25.8 CM
BH CV ECHO MEAS - PULM A REVS DUR: 0.08 SEC
BH CV ECHO MEAS - PULM A REVS VEL: 29.8 CM/SEC
BH CV ECHO MEAS - PULM DIAS VEL: 55.5 CM/SEC
BH CV ECHO MEAS - PULM S/D: 1.5
BH CV ECHO MEAS - PULM SYS VEL: 85.8 CM/SEC
BH CV ECHO MEAS - RAP SYSTOLE: 15 MMHG
BH CV ECHO MEAS - RV MAX PG: 3.1 MMHG
BH CV ECHO MEAS - RV MEAN PG: 1.6 MMHG
BH CV ECHO MEAS - RV V1 MAX: 87.6 CM/SEC
BH CV ECHO MEAS - RV V1 MEAN: 60.6 CM/SEC
BH CV ECHO MEAS - RV V1 VTI: 19.7 CM
BH CV ECHO MEAS - RVSP: 39.8 MMHG
BH CV ECHO MEAS - SI(AO): 99.6 ML/M^2
BH CV ECHO MEAS - SI(CUBED): 28.7 ML/M^2
BH CV ECHO MEAS - SI(LVOT): 54.2 ML/M^2
BH CV ECHO MEAS - SI(MOD-SP2): 31 ML/M^2
BH CV ECHO MEAS - SI(MOD-SP4): 30.6 ML/M^2
BH CV ECHO MEAS - SI(TEICH): 25 ML/M^2
BH CV ECHO MEAS - SV(AO): 221.3 ML
BH CV ECHO MEAS - SV(CUBED): 63.8 ML
BH CV ECHO MEAS - SV(LVOT): 120.5 ML
BH CV ECHO MEAS - SV(MOD-SP2): 69 ML
BH CV ECHO MEAS - SV(MOD-SP4): 67.9 ML
BH CV ECHO MEAS - SV(TEICH): 55.5 ML
BH CV ECHO MEAS - TAPSE (>1.6): 3.1 CM2
BH CV ECHO MEAS - TR MAX VEL: 249.1 CM/SEC
BH CV ECHO MEASUREMENTS AVERAGE E/E' RATIO: 11.21
BH CV XLRA - RV BASE: 3.6 CM
BH CV XLRA - RV LENGTH: 6.8 CM
BH CV XLRA - RV MID: 2.5 CM
IVRT: 63 MSEC
LEFT ATRIUM VOLUME INDEX: 31 ML/M2
LEFT ATRIUM VOLUME: 68 CM3
LV EF 2D ECHO EST: 70 %
MAXIMAL PREDICTED HEART RATE: 156 BPM
STRESS TARGET HR: 133 BPM

## 2019-10-15 PROCEDURE — 93306 TTE W/DOPPLER COMPLETE: CPT | Performed by: INTERNAL MEDICINE

## 2019-10-28 ENCOUNTER — OFFICE VISIT (OUTPATIENT)
Dept: WOUND CARE | Facility: HOSPITAL | Age: 64
End: 2019-10-28

## 2019-10-28 ENCOUNTER — TRANSCRIBE ORDERS (OUTPATIENT)
Dept: ADMINISTRATIVE | Facility: HOSPITAL | Age: 64
End: 2019-10-28

## 2019-10-28 DIAGNOSIS — L97.821 NON-PRESSURE CHRONIC ULCER OF OTHER PART OF LEFT LOWER LEG LIMITED TO BREAKDOWN OF SKIN (HCC): Primary | ICD-10-CM

## 2019-10-30 ENCOUNTER — HOSPITAL ENCOUNTER (OUTPATIENT)
Dept: CARDIOLOGY | Facility: HOSPITAL | Age: 64
Discharge: HOME | End: 2019-10-30
Admitting: SURGERY

## 2019-10-30 DIAGNOSIS — L97.821 NON-PRESSURE CHRONIC ULCER OF OTHER PART OF LEFT LOWER LEG LIMITED TO BREAKDOWN OF SKIN (HCC): ICD-10-CM

## 2019-10-30 LAB
BH CV LEFT LOWER VAS COMMON FEMORAL REFLUX TIME: 1435 MSEC
BH CV LEFT LOWER VAS COMMON FEMORAL TRANSVERSE DIAMETER: 1.08 CM
BH CV LEFT LOWER VAS GSV PROX TRANSVERSE DIAMETER: 0.38 CM
BH CV LEFT LOWER VAS GSVBELOW KNEE TRANSVERSE DIAMETER: 0.19 CM
BH CV LEFT LOWER VAS MID FEMORAL TRANSVERSE DIAMETER: 0.84 CM
BH CV LEFT LOWER VAS POPLITEAL REFLUX TIME: 6586 MSEC
BH CV LEFT LOWER VAS POPLITEAL TRANSVERSE DIAMETER: 0.9 CM
BH CV LEFT LOWER VAS SAPHENOFEMORAL JUNCTION REFLUX TIME: 6170 MSEC
BH CV LEFT LOWER VAS SAPHENOFEMORAL JUNCTION TRANSVERSE DIAMETER: 0.74 CM
BH CV LEFT LOWER VAS SSV PROX CALF TRANS DIAMETER: 0.3 CM
BH CV VAS LEFT COMMON FEMORAL VEIN HIDDEN LRR COLOR FLOW REVERSAL: NORMAL
BH CV VAS LEFT COMMON FEMORAL VEIN HIDDEN LRR COMPRESSIBILTY: NORMAL
BH CV VAS LEFT GSV PROXIMAL HIDDEN LRR COLOR FLOW REVERSAL: NORMAL
BH CV VAS LEFT GSV PROXIMAL HIDDEN LRR COMPRESSIBILTY: NORMAL
BH CV VAS LEFT MID FEMORAL VEIN HIDDEN LRR COLOR FLOW REVERSAL: NORMAL
BH CV VAS LEFT MID FEMORAL VEIN HIDDEN LRR COMPRESSIBILTY: NORMAL
BH CV VAS LEFT POPLITEAL VEIN HIDDEN LRR COLOR FLOW REVERSAL: NORMAL
BH CV VAS LEFT POPLITEAL VEIN HIDDEN LRR COMPRESSIBILTY: NORMAL
BH CV VAS LEFT SAPHENOFEMORAL JUNC HIDDEN LRR COLOR FLOW REVERSAL: NORMAL
BH CV VAS LEFT SAPHENOFEMORAL JUNCTION HIDDEN LRR COMPRESSIBILTY: NORMAL
BH CV VAS LEFT SSV HIDDEN LRR COLOR FLOW REVERSAL: NORMAL
BH CV VAS LEFT SSV HIDDEN LRR COMPRESSIBILTY: NORMAL
MAXIMAL PREDICTED HEART RATE: 156 BPM
STRESS TARGET HR: 133 BPM

## 2019-10-30 PROCEDURE — 93971 EXTREMITY STUDY: CPT

## 2019-10-31 ENCOUNTER — OFFICE VISIT (OUTPATIENT)
Dept: CARDIOLOGY | Facility: CLINIC | Age: 64
End: 2019-10-31

## 2019-10-31 ENCOUNTER — OFFICE VISIT (OUTPATIENT)
Dept: WOUND CARE | Facility: HOSPITAL | Age: 64
End: 2019-10-31

## 2019-10-31 VITALS
OXYGEN SATURATION: 98 % | DIASTOLIC BLOOD PRESSURE: 78 MMHG | HEIGHT: 65 IN | SYSTOLIC BLOOD PRESSURE: 147 MMHG | BODY MASS INDEX: 45.92 KG/M2 | HEART RATE: 72 BPM | WEIGHT: 275.6 LBS

## 2019-10-31 DIAGNOSIS — R00.2 PALPITATIONS: ICD-10-CM

## 2019-10-31 DIAGNOSIS — R06.09 DYSPNEA ON EXERTION: Primary | ICD-10-CM

## 2019-10-31 DIAGNOSIS — I10 ESSENTIAL HYPERTENSION: ICD-10-CM

## 2019-10-31 DIAGNOSIS — I25.10 CORONARY ARTERY DISEASE INVOLVING NATIVE CORONARY ARTERY OF NATIVE HEART WITHOUT ANGINA PECTORIS: ICD-10-CM

## 2019-10-31 PROCEDURE — 99213 OFFICE O/P EST LOW 20 MIN: CPT | Performed by: INTERNAL MEDICINE

## 2019-10-31 RX ORDER — DICLOFENAC SODIUM 75 MG/1
75 TABLET, DELAYED RELEASE ORAL 2 TIMES DAILY
COMMUNITY
End: 2019-11-17 | Stop reason: SDUPTHER

## 2019-10-31 NOTE — PROGRESS NOTES
"   History of Present Illness:     Dear Luz Maria     CC:   New symptoms of dyspnea the past 2 months, history of obstructive sleep apnea.  Follow-up for coronary artery disease,  Palpitations.  History of hypertension, diabetes,     Mrs. Nohemi Mesa Is a very pleasant 64 years old lady with no known history of obstructive coronary artery disease ars.  She is a known hypertensive with  diabetes.     She  currently takes Januvia.          Her myocardial perfusion imaging study showed no evidence of inducible myocardial ischemia.  Coronary artery calcium score was very high -- 2716 !!      /78 (BP Location: Left arm)   Pulse 72   Ht 165.1 cm (65\")   Wt 125 kg (275 lb 9.6 oz)   SpO2 98%   BMI 45.86 kg/m²      Her cardiac catheterization  in  August of 2018 showed 70% lesion at the ostium of a small obtuse marginal branch and other arteries showed significant calcification but no occlusive disease. PCI at this point was not recommended because  small size of OMB     Indication for EKG coronary artery disease.     EKG showed normal sinus rhythm low precordial voltage and poor R wave progression across precordial leads.  WI interval was 162 ms QRS duration was 90 ms QTC was 444 ms and QRS axis was 54.  EKG was similar to EKG of 2/12/2019     Patient has been experiencing shortness of breath on exertion and complains of some tightness in her chest.  I will increase her isosorbide mononitrate from 30 mg daily to 45 mg daily.  Her blood pressure is on the low side so I cannot increase her dose to 60 mg daily at this time.       She is s/p neck surgery per formed b nicole Woodson  and has done very well from cardiac standpoint.    Her echocardiogram performed on 10/1/2019 showed normal left ventricular internal dimensions with mild concentric left ventricular hypertrophy, LV ejection fraction of 70%, trace mitral regurgitation, trace tricuspid regurgitation with RVSP of 40 mmHg were also noted.    She has seen pulmonary " specialist and sleep medicine specialists.  She also is seeing gastroenterologist for esophageal stricture which had been stretched 3 times.  She also is being evaluated for gastroparesis.     A/P  1-   Coronary artery disease.  Stable with no anginal symptoms.     2.  Dyspnea.    Echocardiogram results are noted above.  Because of her dyspnea remains unclear.    3- We will check lipids and see if she is a candidate for Vascepa for primary/secondary  prevention     Thank you very much for allowing us to participate in the care of your patients                    Problems: Active problems were reviewed with the patient during this visit.  Medications: Medications were reviewed with the patient during this visit.  Allergies: Allergies were reviewed with the patient during this visit.                    Past Medical History:     Reviewed history from 09/04/2018 and no changes required:        Arthritis        Diabetes, Type 2        Hypertension        Obesity        Hx: Sinus Arrhythmia         NURIA - Moderate to severe        Cellulitis : left leg / 2018        Calcium Score 7/9/18 2716        Coronary Heart Disease        Pinched nerve      Past Surgical History:     Reviewed history from 12/11/2018 and no changes required:        Eye Surgery Right Eye -         Carpal Tunnel Release  Right / Left         Tonsillectomy        Knee Arthroscopy         left knee replacement 2016        Rt knee replacement 2017        Lap Band 2008/ Followed by Band Removal 2011        Heart Catherization: [facility] 8-17-18- significant epicardial coronary artery calcifications with only limited luminal compromise involving a small obtuse marginal branch. Medical management at this time:         Back surgery : 2-22-19 Dr. Woodson      Active Medications (reviewed today):  OMEPRAZOLE 40 MG ORAL CAPSULE DELAYED RELEASE (OMEPRAZOLE)   JARDIANCE 25 MG ORAL TABLET (EMPAGLIFLOZIN) Take 1 tablet by mouth daily  METFORMIN HCL  MG ORAL TABLET  EXTENDED RELEASE 24 HOUR (METFORMIN HCL) Take 2 tablets by mouth twice  daily  JANUVIA 100 MG ORAL TABLET (SITAGLIPTIN PHOSPHATE) Take 1 tablet by mouth daily  SERTRALINE HCL 50 MG ORAL TABLET (SERTRALINE HCL) Take 1 tablet by mouth daily  LYRICA 100 MG ORAL CAPSULE (PREGABALIN) Take one (1) tablet by mouth twice a day  ISOSORBIDE MONONITRATE ER 30 MG ORAL TABLET EXTENDED RELEASE 24 HOUR (ISOSORBIDE MONONITRATE) Take once a day by mouth.  FUROSEMIDE 40 MG ORAL TABLET (FUROSEMIDE) Take one by mouth daily- BUT pt. only takes on weekends  ASPIR-LOW 81 MG ORAL TABLET DELAYED RELEASE (ASPIRIN) Take 1 tablet by mouth daily  GLIPIZIDE TABLET (GLIPIZIDE TABS)   LISINOPRIL-HYDROCHLOROTHIAZIDE 20-12.5 MG ORAL TABLET (LISINOPRIL-HYDROCHLOROTHIAZIDE) Take 1 tablet by mouth daily     Current Allergies (reviewed today):  * NKDA (Critical)     Family History Summary:      Reviewed history Last on 02/06/2019 and no changes required:04/02/2019  Brother - Has Family History of Hypertension - Brother has HTN  - Entered On: 7/8/2015  Mother - Has Family History of Heart Disease - Mother had PCI / stents  - Entered On: 7/8/2015     General Comments - FH:   denies family history of neck and back problems     Social History:     Reviewed history from 07/08/2015 and no changes required:        Marital Status:         Children: 5 -         Occupation: Employed         Passive smoke exposure - no        Alcohol Use - no        Drug Use - no        HIV/High Risk - no           Smoking History:        Patient is a former smoker.           Risk Factors:      Smoked Tobacco Use:  Former smoker     Cigarettes:  Yes -- 1 pack(s) per day,      Year started:  age 21         Year quit:  1985        Years Since Last Quit:  34  Smokeless Tobacco Use:  Never  Passive smoke exposure:  no  Drug use:  no  HIV high-risk behavior:  no  Caffeine use:  4+ drinks per day  Alcohol use:  no     Family History Risk Factors:     Family History of MI in  females < 65 years old:  no     Family History of MI in males < 55 years old:  no           Review of Systems   General: denies fevers, chills, sweats, anorexia, fatigue, malaise, weight loss  Eyes: denies blurring, diplopia, irritation, discharge, vision loss, eye pain, photophobia  Cardiovascular:  coronary artery disease.  Diabetes,  Respiratory: Denies cough, dyspnea, excessive sputum, hemoptysis, wheezing  Musculoskeletal:  history of neck surgery  Neurologic: denies transient paralysis, weakness, paresthesias, seizures, syncope, tremors, vertigo  Psychiatric: denies depression, anxiety, memory loss, mental disturbance, suicidal ideation, hallucinations, paranoia        Physical Exam     General:      well developed, well nourished, in no acute distress.    Neck:      no masses, thyromegaly, or abnormal cervical nodes.   no JVD. No carotid bruits  Lungs:      clear bilaterally to auscultation.    Heart:      non-displaced PMI, chest non-tender; regular rate and rhythm, S1, S2 without murmurs, rubs, or gallops     Abdomen soft obese nontender with no organomegaly masses abnormal pulsations or bruits.  Pulses:      pulses normal in all 4 extremities.    Extremities:       no edema     There is no cyanosis or clubbing.  Skin is warm and dry.

## 2019-11-02 ENCOUNTER — LAB (OUTPATIENT)
Dept: LAB | Facility: HOSPITAL | Age: 64
End: 2019-11-02

## 2019-11-02 DIAGNOSIS — R06.09 DYSPNEA ON EXERTION: ICD-10-CM

## 2019-11-02 LAB
CHOLEST SERPL-MCNC: 144 MG/DL (ref 0–200)
HDLC SERPL-MCNC: 36 MG/DL (ref 40–60)
LDLC SERPL CALC-MCNC: 77 MG/DL (ref 0–100)
LDLC/HDLC SERPL: 2.13 {RATIO}
TRIGL SERPL-MCNC: 157 MG/DL (ref 0–150)
VLDLC SERPL-MCNC: 31.4 MG/DL (ref 5–40)

## 2019-11-02 PROCEDURE — 80061 LIPID PANEL: CPT

## 2019-11-02 PROCEDURE — 36415 COLL VENOUS BLD VENIPUNCTURE: CPT

## 2019-11-05 ENCOUNTER — OFFICE VISIT (OUTPATIENT)
Dept: WOUND CARE | Facility: HOSPITAL | Age: 64
End: 2019-11-05

## 2019-11-07 ENCOUNTER — OFFICE VISIT (OUTPATIENT)
Dept: WOUND CARE | Facility: HOSPITAL | Age: 64
End: 2019-11-07

## 2019-11-11 ENCOUNTER — OFFICE VISIT (OUTPATIENT)
Dept: WOUND CARE | Facility: HOSPITAL | Age: 64
End: 2019-11-11

## 2019-11-11 PROCEDURE — G0463 HOSPITAL OUTPT CLINIC VISIT: HCPCS

## 2019-11-19 ENCOUNTER — OFFICE (AMBULATORY)
Dept: URBAN - METROPOLITAN AREA CLINIC 64 | Facility: CLINIC | Age: 64
End: 2019-11-19
Payer: COMMERCIAL

## 2019-11-19 VITALS — DIASTOLIC BLOOD PRESSURE: 52 MMHG | HEART RATE: 72 BPM | HEIGHT: 65 IN | SYSTOLIC BLOOD PRESSURE: 124 MMHG

## 2019-11-19 DIAGNOSIS — E11.9 TYPE 2 DIABETES MELLITUS WITHOUT COMPLICATIONS: ICD-10-CM

## 2019-11-19 DIAGNOSIS — I10 ESSENTIAL (PRIMARY) HYPERTENSION: ICD-10-CM

## 2019-11-19 DIAGNOSIS — K59.00 CONSTIPATION, UNSPECIFIED: ICD-10-CM

## 2019-11-19 DIAGNOSIS — R14.2 ERUCTATION: ICD-10-CM

## 2019-11-19 DIAGNOSIS — R13.19 OTHER DYSPHAGIA: ICD-10-CM

## 2019-11-19 PROCEDURE — 99213 OFFICE O/P EST LOW 20 MIN: CPT | Performed by: NURSE PRACTITIONER

## 2019-11-20 ENCOUNTER — OFFICE VISIT (OUTPATIENT)
Dept: ORTHOPEDIC SURGERY | Facility: CLINIC | Age: 64
End: 2019-11-20

## 2019-11-20 VITALS
HEART RATE: 69 BPM | DIASTOLIC BLOOD PRESSURE: 72 MMHG | WEIGHT: 279 LBS | SYSTOLIC BLOOD PRESSURE: 145 MMHG | BODY MASS INDEX: 46.43 KG/M2

## 2019-11-20 DIAGNOSIS — M48.062 LUMBAR STENOSIS WITH NEUROGENIC CLAUDICATION: Primary | ICD-10-CM

## 2019-11-20 PROCEDURE — 99212 OFFICE O/P EST SF 10 MIN: CPT | Performed by: PHYSICIAN ASSISTANT

## 2019-11-20 RX ORDER — DICLOFENAC SODIUM 75 MG/1
TABLET, DELAYED RELEASE ORAL
Qty: 180 TABLET | Refills: 4 | Status: SHIPPED | OUTPATIENT
Start: 2019-11-20

## 2019-11-22 NOTE — PROGRESS NOTES
Orthopedic Spine Follow Up    Referring Provider: No ref. provider found  Primary Care Provider: Luz Maria Gallardo APRN    Patient Name: Nohemi Mesa  Patient Age: 64 y.o.  Chief Complaint: had concerns including Pain of the Lumbar Spine; Pain of the Thoracic Spine; and Pain of the Cervical Spine.    History of Present Illness: Nohemi Mesa is a 64 y.o. year old female here in  Follow up today with chief complaint of had concerns including Pain of the Lumbar Spine; Pain of the Thoracic Spine; and Pain of the Cervical Spine..  This patient is here as post ACDF of C4-C7.  Doing good as far as her cervical spine is concerned.  Her main concern today is low back pain and the need to bend forward whenever she stands for any length of time or ambulates.  She has known lumbar disc degeneration with spinal stenosis.  She states when she sits down or bends forward her pain in her back gets better.  She also notes that if she stands upright and walks for any length of time her legs began to get a little bit numb and a little bit weak feeling.  She denies change in bowel or bladder habits.  She is struggling with her weight.    Imaging:  We had a chance to review her MRI lumbar spine from 2018.  Demonstrates disc degeneration spinal stenosis with the disc herniation at L2-3, L3-4, L4-5 with degenerative listhesis at L4-5.    Assessment: No diagnosis found.    Plan:  At this point in time of recommend the patient get down to a BMI of 40 prior to proceeding with any lumbar decompression surgery.  She will follow-up here if her pain worsens or when she gets down to an operable weight.  She voiced understanding.    Subjective     Review of Systems   Constitutional: Positive for activity change. Negative for fatigue and fever.   HENT: Negative for sore throat.    Eyes: Negative for double vision.   Respiratory: Negative for choking.    Gastrointestinal: Negative for abdominal pain and constipation.   Genitourinary: Negative  for dysuria.   Musculoskeletal: Positive for back pain and gait problem.   Skin: Negative for rash.   Neurological: Positive for numbness.   Hematological: Does not bruise/bleed easily.   Psychiatric/Behavioral: Positive for sleep disturbance.       The following portions of the patient's history were reviewed and updated as appropriate: allergies, current medications, past family history, past medical history, past social history, past surgical history and problem list.    Objective     Physical Exam   Constitutional: She is oriented to person, place, and time. She appears well-developed.   HENT:   Head: Normocephalic and atraumatic.   Eyes: Conjunctivae are normal.   Neck: Normal range of motion.   Cardiovascular: Normal rate.   Pulmonary/Chest: Effort normal.   Abdominal: There is no guarding.   Musculoskeletal: She exhibits tenderness.        Right hip: Normal.        Left hip: Normal.        Lumbar back: She exhibits decreased range of motion, tenderness and pain.   Neurological: She is oriented to person, place, and time.   Skin: Skin is warm.   Psychiatric: Her behavior is normal.   Vitals reviewed.    Ortho Exam      No diagnosis found.     No orders of the defined types were placed in this encounter.      Procedures

## 2019-12-05 ENCOUNTER — OFFICE VISIT (OUTPATIENT)
Dept: PODIATRY | Facility: CLINIC | Age: 64
End: 2019-12-05

## 2019-12-05 VITALS
WEIGHT: 279 LBS | SYSTOLIC BLOOD PRESSURE: 149 MMHG | HEIGHT: 65 IN | BODY MASS INDEX: 46.48 KG/M2 | DIASTOLIC BLOOD PRESSURE: 80 MMHG | HEART RATE: 76 BPM

## 2019-12-05 DIAGNOSIS — L85.3 XEROSIS OF SKIN: ICD-10-CM

## 2019-12-05 DIAGNOSIS — M19.071 ARTHRITIS OF MIDTARSAL JOINT OF RIGHT FOOT: Primary | ICD-10-CM

## 2019-12-05 PROBLEM — G60.9 HEREDITARY AND IDIOPATHIC NEUROPATHY: Status: ACTIVE | Noted: 2018-10-30

## 2019-12-05 PROBLEM — N39.0 UTI (URINARY TRACT INFECTION): Status: ACTIVE | Noted: 2019-02-13

## 2019-12-05 PROBLEM — G95.9 CERVICAL MYELOPATHY (HCC): Status: ACTIVE | Noted: 2019-01-02

## 2019-12-05 PROBLEM — M48.061 SPINAL STENOSIS OF LUMBAR REGION WITHOUT NEUROGENIC CLAUDICATION: Status: ACTIVE | Noted: 2018-10-30

## 2019-12-05 PROBLEM — M19.90 ARTHRITIS: Status: ACTIVE | Noted: 2019-12-05

## 2019-12-05 PROBLEM — M79.673 PAIN OF FOOT: Status: ACTIVE | Noted: 2019-04-03

## 2019-12-05 PROBLEM — L84 CALLUS OF FOOT: Status: ACTIVE | Noted: 2019-02-06

## 2019-12-05 PROBLEM — M48.062 SPINAL STENOSIS, LUMBAR REGION WITH NEUROGENIC CLAUDICATION: Status: ACTIVE | Noted: 2019-01-16

## 2019-12-05 PROBLEM — M54.50 LOW BACK PAIN: Status: ACTIVE | Noted: 2018-10-30

## 2019-12-05 PROBLEM — B35.1 ONYCHOMYCOSIS: Status: ACTIVE | Noted: 2019-02-06

## 2019-12-05 PROBLEM — M47.12 CERVICAL SPONDYLOSIS WITH MYELOPATHY: Status: ACTIVE | Noted: 2019-01-16

## 2019-12-05 PROBLEM — I25.10 CORONARY HEART DISEASE: Status: ACTIVE | Noted: 2018-09-04

## 2019-12-05 PROBLEM — E66.9 OBESITY: Status: ACTIVE | Noted: 2019-12-05

## 2019-12-05 PROBLEM — I87.2 VENOUS STASIS DERMATITIS: Status: ACTIVE | Noted: 2018-10-30

## 2019-12-05 PROBLEM — R07.89 CHEST DISCOMFORT: Status: ACTIVE | Noted: 2018-06-12

## 2019-12-05 PROBLEM — M54.12 CERVICAL RADICULITIS: Status: ACTIVE | Noted: 2019-01-16

## 2019-12-05 PROBLEM — R13.10 DYSPHAGIA, UNSPECIFIED: Status: ACTIVE | Noted: 2019-01-15

## 2019-12-05 PROBLEM — M19.079 ARTHRITIS OF FOOT: Status: ACTIVE | Noted: 2019-02-06

## 2019-12-05 PROBLEM — M96.1 CERVICAL POST-LAMINECTOMY SYNDROME: Status: ACTIVE | Noted: 2019-05-24

## 2019-12-05 PROBLEM — G89.29 CHRONIC PAIN: Status: ACTIVE | Noted: 2019-05-24

## 2019-12-05 PROBLEM — M41.9 ACQUIRED SCOLIOSIS: Status: ACTIVE | Noted: 2018-10-30

## 2019-12-05 PROBLEM — M48.10 ANKYLOSING VERTEBRAL HYPEROSTOSIS: Status: ACTIVE | Noted: 2018-10-30

## 2019-12-05 PROBLEM — M72.2 PLANTAR FASCIITIS, BILATERAL: Status: ACTIVE | Noted: 2019-02-06

## 2019-12-05 PROBLEM — M54.16 LUMBAR RADICULITIS: Status: ACTIVE | Noted: 2019-01-16

## 2019-12-05 PROBLEM — M47.817 OSTEOARTHRITIS OF LUMBOSACRAL SPINE WITHOUT MYELOPATHY: Status: ACTIVE | Noted: 2019-01-16

## 2019-12-05 PROCEDURE — 99213 OFFICE O/P EST LOW 20 MIN: CPT | Performed by: PODIATRIST

## 2019-12-05 NOTE — PROGRESS NOTES
"12/05/2019  Foot and Ankle Surgery - Established Patient/Follow-up  Provider: Dr. Bennie Ansari DPM  Location: [facility]    Subjective:  Nohemi Mesa is a 64 y.o. female.     Chief Complaint   Patient presents with   • Right Foot - Follow-up       HPI: Patient returns for issues with her right foot.  She does complain of large callus formation involving the plantar medial aspect of the right great toe.  She has had intermittent fissuring which is now healed.  She complains of discomfort.  She has tried various topicals without improvement.  She also complains of midfoot discomfort which is secondary to the arthritis.  She denies any additional issues today.    No Known Allergies    Current Outpatient Medications on File Prior to Visit   Medication Sig Dispense Refill   • diclofenac (VOLTAREN) 75 MG EC tablet TAKE 1 TABLET TWICE A DAY AS NEEDED FOR PAIN 180 tablet 4   • Fluticasone Furoate-Vilanterol (BREO ELLIPTA) 100-25 MCG/INH inhaler Inhale 1 puff Daily.     • furosemide (LASIX) 40 MG tablet Daily As Needed.     • glipizide (GLUCOTROL XL) 5 MG ER tablet Daily.     • isosorbide mononitrate (IMDUR) 30 MG 24 hr tablet Take 30 mg by mouth Daily.     • JANUVIA 100 MG tablet Take 100 mg by mouth Daily.     • lisinopril-hydrochlorothiazide (PRINZIDE,ZESTORETIC) 20-12.5 MG per tablet TAKE 1 TABLET DAILY     • metFORMIN ER (GLUCOPHAGE-XR) 500 MG 24 hr tablet Take 500 mg by mouth Daily.     • omeprazole (priLOSEC) 40 MG capsule Take 40 mg by mouth Daily.     • pregabalin (LYRICA) 100 MG capsule TAKE ONE CAPSULE BY MOUTH TWICE A DAY     • PROAIR  (90 Base) MCG/ACT inhaler As Needed.     • sertraline (ZOLOFT) 50 MG tablet Daily.     • mometasone (NASONEX) 50 MCG/ACT nasal spray        No current facility-administered medications on file prior to visit.        Objective   /80   Pulse 76   Ht 165.1 cm (65\")   Wt 127 kg (279 lb)   BMI 46.43 kg/m²     Podiatry Exam       General Appearance:  well " developed, well nourished, in no acute distress.    Mental Status Exam        Judgement and Insight:  Intact       Orientation:  AAO x3  Cardiovascular (Bilateral)       Dorsalis Pedis Pulse (BL):  2/4       Posterior Tibialis Pulse (BL):  2/4       Capillary Filling Time (BL):  1-3 Seconds       Edema (BL):   moderate pitting edema to bilateral lower extremities  Dermatological Exam       Temperature:  warm to warm       Hair Growth:  normal hair growth  Moderate hyperkeratotic tissue involving the plantar medial aspect of the right great toe.  No fissuring or enoch open wounds at this time.  No signs of infection  Neurological Exam (Bilateral)       Paresthesia (Bl):  negative       Achilles DTRs (Bl):  symmetric       Tinel over Tarsal Tunnel (Bl):  negative  Monofilament Test (Bl):   not intact       Diabetic Risk (Bl):  Loss of protective sensation with no weakness deformity callus or pre-ulcer        MusculoSkeletal Exam (Bilateral)       Gait and Stance (Bl):   non antalgic.  Unassisted       ROM (Bl):   ankle and pedal joint range of motion is somewhat limited but nontender and crepitus free       Muscle Strength (Bl):  symmetrical 5/5       Subluxed Digits (Bl):  no subluxation or laxity of joints       Dislocated Joints (Bl):  no dislocation of joints       Medial Turbicle Calc (Bl):   moderate discomfort, right greater than left       Gastroc soleus equinus (Bl):  Inability to dorsiflex past neutral position both straight legged and bent knee       Post tibial tendon (Bl):  no soreness noted       Peroneal Tendon (Bl):  no soreness noted  Additional Musculoskelatal Findings   discomfort with palpation to the dorsal midfoot region, right greater than left.  Bony prominence noted to the dorsal midfoot.  No gross deformity or instability.       Assessment/Plan   Nohemi was seen today for follow-up.    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  She does complain of callus formation involving the plantar medial aspect of the great toe.  I have recommended that she start a urea topical twice daily.  We did review the proper use and effects.  I have asked that she wear appropriate shoes on a daily basis and avoid barefoot walking.  We did discuss the midfoot discomfort which is consistent with arthritis.  Imaging was performed today showing no substantial changes.  She does have severe degenerative changes to the midfoot likely causing some of the discomfort to the forefoot as well.  I have asked that she see me in 6 weeks for reevaluation.    Orders Placed This Encounter   Procedures   • XR Foot 3+ View Right     rm [phone] for awhile, unknown, WB     Order Specific Question:   Reason for Exam:     Answer:   right foot pain          Note is dictated utilizing voice recognition software. Unfortunately this leads to occasional typographical errors. I apologize in advance if the situation occurs. If questions occur please do not hesitate to call our office.

## 2020-02-24 ENCOUNTER — OFFICE (AMBULATORY)
Dept: URBAN - METROPOLITAN AREA CLINIC 51 | Facility: CLINIC | Age: 65
End: 2020-02-24
Payer: COMMERCIAL

## 2020-02-24 DIAGNOSIS — R14.2 ERUCTATION: ICD-10-CM

## 2020-02-24 DIAGNOSIS — R13.10 DYSPHAGIA, UNSPECIFIED: ICD-10-CM

## 2020-02-24 PROCEDURE — 91037 ESOPH IMPED FUNCTION TEST: CPT | Performed by: NURSE PRACTITIONER

## 2020-02-24 PROCEDURE — 91010 ESOPHAGUS MOTILITY STUDY: CPT | Performed by: NURSE PRACTITIONER

## 2020-06-23 RX ORDER — ISOSORBIDE MONONITRATE 30 MG/1
30 TABLET, EXTENDED RELEASE ORAL DAILY
Qty: 90 TABLET | Refills: 1 | Status: SHIPPED | OUTPATIENT
Start: 2020-06-23 | End: 2020-06-29 | Stop reason: SDUPTHER

## 2020-06-29 ENCOUNTER — OFFICE VISIT (OUTPATIENT)
Dept: CARDIOLOGY | Facility: CLINIC | Age: 65
End: 2020-06-29

## 2020-06-29 VITALS
WEIGHT: 271 LBS | SYSTOLIC BLOOD PRESSURE: 158 MMHG | RESPIRATION RATE: 18 BRPM | OXYGEN SATURATION: 97 % | DIASTOLIC BLOOD PRESSURE: 75 MMHG | HEART RATE: 84 BPM | HEIGHT: 65 IN | BODY MASS INDEX: 45.15 KG/M2

## 2020-06-29 DIAGNOSIS — I10 ESSENTIAL HYPERTENSION: Primary | ICD-10-CM

## 2020-06-29 DIAGNOSIS — G47.33 OBSTRUCTIVE SLEEP APNEA SYNDROME: ICD-10-CM

## 2020-06-29 DIAGNOSIS — I25.10 CORONARY ARTERY DISEASE INVOLVING NATIVE CORONARY ARTERY OF NATIVE HEART WITHOUT ANGINA PECTORIS: ICD-10-CM

## 2020-06-29 DIAGNOSIS — R94.31 ABNORMAL ELECTROCARDIOGRAM: ICD-10-CM

## 2020-06-29 PROCEDURE — 99214 OFFICE O/P EST MOD 30 MIN: CPT | Performed by: INTERNAL MEDICINE

## 2020-06-29 PROCEDURE — 93000 ELECTROCARDIOGRAM COMPLETE: CPT | Performed by: INTERNAL MEDICINE

## 2020-06-29 RX ORDER — BUPROPION HYDROCHLORIDE 150 MG/1
150 TABLET ORAL DAILY
COMMUNITY

## 2020-06-29 RX ORDER — MODAFINIL 100 MG/1
100 TABLET ORAL DAILY
COMMUNITY

## 2020-06-29 RX ORDER — ISOSORBIDE MONONITRATE 30 MG/1
30 TABLET, EXTENDED RELEASE ORAL DAILY
Qty: 90 TABLET | Refills: 3 | Status: SHIPPED | OUTPATIENT
Start: 2020-06-29

## 2020-06-29 NOTE — PROGRESS NOTES
Cardiology Office Visit      Encounter Date:  06/29/2020    Patient ID:   Nohemi Mesa is a 64 y.o. female.    Reason For Followup:  dyspnea, history of obstructive sleep apnea, coronary artery disease,  Palpitations.  History of hypertension, diabetes,    Brief Clinical History:  Dear Dr. Gallardo, JOSE MANUEL Soto    I had the pleasure of seeing Nohemi Mesa today. As you are well aware, this is a 64 y.o. female here for follow-up for above medical problem     Coronary artery calcium score was very high -- 2716        Her cardiac catheterization  in  August of 2018 showed 70% lesion at the ostium of a small obtuse marginal branch and other arteries showed significant calcification but no occlusive disease. PCI at this point was not recommended because  small size of OMB        Her echocardiogram performed on 10/1/2019 showed normal left ventricular internal dimensions with mild concentric left ventricular hypertrophy, LV ejection fraction of 70%, trace mitral regurgitation, trace tricuspid regurgitation with RVSP of 40 mmHg were also noted.    She has seen pulmonary specialist and sleep medicine specialists.  She also is seeing gastroenterologist for esophageal stricture which had been stretched 3 times.  She also is being evaluated for gastroparesis.         Interval History:  Complaining of some shortness of breath that is unchanged  No symptoms of chest pain  No dizziness or syncope    Assessment & Plan    Impressions:  1-   Coronary artery disease.  Stable with no anginal symptoms.  Continue current medical therapy   Recommend baby aspirin once a day and also consider statins if the lipids are elevated     2.  Dyspnea.    Echocardiogram results are noted above.  Likely combination of diastolic dysfunction pulmonary hypertension obstructive sleep apnea and diastolic dysfunction and obesity    For regular exercise and weight loss discussed with the patient    3- We will check lipids and continue close  "monitoring     Thank you very much for allowing us to participate in the care of your patients    Recommendations:  Need for regular exercise and weight loss discussed with the patient  Aggressive therapy for obstructive sleep apnea  Continue close monitoring  Follow-up in office in 6 months    Objective:    Vitals:  Vitals:    06/29/20 1443   BP: 158/75   BP Location: Left arm   Pulse: 84   Resp: 18   SpO2: 97%   Weight: 123 kg (271 lb)   Height: 165.1 cm (65\")       Physical Exam:    General: Alert, cooperative, no distress, appears stated age/morbid obesity  Head:  Normocephalic, atraumatic, mucous membranes moist  Eyes:  Conjunctiva/corneas clear, EOM's intact     Neck:  Supple,  no adenopathy;      Lungs: Clear to auscultation bilaterally, no wheezes rhonchi rales are noted  Chest wall: No tenderness  Heart::  Regular rate and rhythm, S1 and S2 normal, no murmur, rub or gallop  Abdomen: Soft, non-tender, nondistended bowel sounds active  Extremities: No cyanosis, clubbing, or edema  Pulses: 2+ and symmetric all extremities  Skin:  No rashes or lesions  Neuro/psych: A&O x3. CN II through XII are grossly intact with appropriate affect      Allergies:  No Known Allergies    Medication Review:     Current Outpatient Medications:   •  buPROPion XL (WELLBUTRIN XL) 150 MG 24 hr tablet, Take 150 mg by mouth Daily., Disp: , Rfl:   •  diclofenac (VOLTAREN) 75 MG EC tablet, TAKE 1 TABLET TWICE A DAY AS NEEDED FOR PAIN, Disp: 180 tablet, Rfl: 4  •  furosemide (LASIX) 40 MG tablet, Daily As Needed., Disp: , Rfl:   •  glipizide (GLUCOTROL XL) 5 MG ER tablet, 2 (Two) Times a Day., Disp: , Rfl:   •  isosorbide mononitrate (IMDUR) 30 MG 24 hr tablet, Take 1 tablet by mouth Daily., Disp: 90 tablet, Rfl: 1  •  JANUVIA 100 MG tablet, Take 100 mg by mouth Daily., Disp: , Rfl:   •  lisinopril-hydrochlorothiazide (PRINZIDE,ZESTORETIC) 20-12.5 MG per tablet, TAKE 1 TABLET DAILY, Disp: , Rfl:   •  metFORMIN ER (GLUCOPHAGE-XR) 500 MG 24 " hr tablet, Take 500 mg by mouth 2 (Two) Times a Day., Disp: , Rfl:   •  modafinil (PROVIGIL) 100 MG tablet, Take 100 mg by mouth Daily., Disp: , Rfl:   •  omeprazole (priLOSEC) 40 MG capsule, Take 40 mg by mouth 2 (Two) Times a Day., Disp: , Rfl:   •  pregabalin (LYRICA) 100 MG capsule, Take 100 mg by mouth Daily., Disp: , Rfl:   •  sertraline (ZOLOFT) 50 MG tablet, Daily., Disp: , Rfl:     Family History:  Family History   Problem Relation Age of Onset   • Heart disease Mother         PCI/STENTS   • Hypertension Brother        Past Medical History:  Past Medical History:   Diagnosis Date   • Arthritis    • Cellulitis     left leg    • Cellulitis of left leg    • Coronary heart disease    • Diabetes mellitus (CMS/HCC)    • H/O neck surgery 02/22/2019   • History of type 2 diabetes mellitus    • Hypertension    • Obesity    • NURIA (obstructive sleep apnea)     MODERATE TO SEVERE- C-pap machine    • Pinched nerve    • Sinus arrhythmia    • Torn tendon     Right Foot       Past surgical History:  Past Surgical History:   Procedure Laterality Date   • CARDIAC CATHETERIZATION  08/17/2018    BHF 08/17/2018 SIGNIFCANT EPICARDIAL CORONARY ARTERY CALCIFICATIONS WITH ONLY LIMITED LUMINAL COMPROMISE INVOLVING A SMALL OBTUSE MARGINAL BRANCH.  MEDICAL MANAGEMENT AT THIS TIME   • CARPAL TUNNEL RELEASE      RIGHT/LEFT   • EYE SURGERY Right    • KNEE ARTHROSCOPY     • LAPAROSCOPIC GASTRIC BANDING      2008/FOLLOWED BY BAND REMOVAL 2011   • OTHER SURGICAL HISTORY      POST OP 02/22/2019  ACDF  C4-C7, VALE C6, DR STOREY   • TONSILLECTOMY     • TOTAL KNEE ARTHROPLASTY Left 2016   • TOTAL KNEE ARTHROPLASTY Right 2017       Social History:  Social History     Socioeconomic History   • Marital status:      Spouse name: Not on file   • Number of children: Not on file   • Years of education: Not on file   • Highest education level: Not on file   Tobacco Use   • Smoking status: Former Smoker     Packs/day: 1.00     Types: Cigarettes      Last attempt to quit: 10/1/1985     Years since quittin.7   • Smokeless tobacco: Never Used   Substance and Sexual Activity   • Alcohol use: No     Frequency: Never   • Drug use: No   • Sexual activity: Defer       Review of Systems:  The following systems were reviewed as they relate to the cardiovascular system: Constitutional, Eyes, ENT, Cardiovascular, Respiratory, Gastrointestinal, Integumentary, Neurological, Psychiatric, Hematologic, Endocrine, Musculoskeletal, and Genitourinary. The pertinent cardiovascular findings are reported above with all other cardiovascular points within those systems being negative.    Diagnostic Study Review:     Current Electrocardiogram:    ECG 12 Lead  Date/Time: 2020 3:08 PM  Performed by: Pamela Burton MD  Authorized by: Pamela Burton MD   Comparison: compared with previous ECG   Similar to previous ECG  Rhythm: sinus rhythm  Rate: normal  BPM: 64  Conduction: conduction normal  QRS axis: normal    Clinical impression: normal ECG              NOTE: The following portions of the patient's history were reviewed and updated this visit as appropriate: allergies, current medications, past family history, past medical history, past social history, past surgical history and problem list.

## 2020-09-04 ENCOUNTER — ON CAMPUS - OUTPATIENT (AMBULATORY)
Dept: URBAN - METROPOLITAN AREA HOSPITAL 77 | Facility: HOSPITAL | Age: 65
End: 2020-09-04

## 2020-09-04 DIAGNOSIS — R13.10 DYSPHAGIA, UNSPECIFIED: ICD-10-CM

## 2020-09-04 DIAGNOSIS — R93.3 ABNORMAL FINDINGS ON DIAGNOSTIC IMAGING OF OTHER PARTS OF DI: ICD-10-CM

## 2020-09-04 PROCEDURE — 43450 DILATE ESOPHAGUS 1/MULT PASS: CPT | Performed by: INTERNAL MEDICINE

## 2020-09-04 PROCEDURE — 43235 EGD DIAGNOSTIC BRUSH WASH: CPT | Performed by: INTERNAL MEDICINE

## 2020-09-17 ENCOUNTER — OFFICE (AMBULATORY)
Dept: URBAN - METROPOLITAN AREA CLINIC 64 | Facility: CLINIC | Age: 65
End: 2020-09-17

## 2020-09-17 VITALS
HEART RATE: 72 BPM | WEIGHT: 269 LBS | DIASTOLIC BLOOD PRESSURE: 63 MMHG | SYSTOLIC BLOOD PRESSURE: 116 MMHG | HEIGHT: 65 IN

## 2020-09-17 DIAGNOSIS — R10.10 UPPER ABDOMINAL PAIN, UNSPECIFIED: ICD-10-CM

## 2020-09-17 DIAGNOSIS — R07.89 OTHER CHEST PAIN: ICD-10-CM

## 2020-09-17 DIAGNOSIS — R11.2 NAUSEA WITH VOMITING, UNSPECIFIED: ICD-10-CM

## 2020-09-17 PROCEDURE — 99213 OFFICE O/P EST LOW 20 MIN: CPT | Performed by: NURSE PRACTITIONER

## 2020-10-07 ENCOUNTER — PREP FOR SURGERY (OUTPATIENT)
Dept: OTHER | Facility: HOSPITAL | Age: 65
End: 2020-10-07

## 2020-10-07 ENCOUNTER — OFFICE VISIT (OUTPATIENT)
Dept: SURGERY | Facility: CLINIC | Age: 65
End: 2020-10-07

## 2020-10-07 VITALS
HEART RATE: 93 BPM | DIASTOLIC BLOOD PRESSURE: 74 MMHG | HEIGHT: 65 IN | OXYGEN SATURATION: 93 % | WEIGHT: 267 LBS | TEMPERATURE: 97.1 F | SYSTOLIC BLOOD PRESSURE: 134 MMHG | BODY MASS INDEX: 44.48 KG/M2

## 2020-10-07 DIAGNOSIS — K80.10 CHOLECYSTITIS WITH CHOLELITHIASIS: Primary | ICD-10-CM

## 2020-10-07 DIAGNOSIS — K80.18 CALCULUS OF GALLBLADDER WITH CHOLECYSTITIS OF OTHER ACUITY WITHOUT OBSTRUCTION: Primary | ICD-10-CM

## 2020-10-07 PROCEDURE — 99203 OFFICE O/P NEW LOW 30 MIN: CPT | Performed by: SURGERY

## 2020-10-07 RX ORDER — SODIUM CHLORIDE 9 MG/ML
100 INJECTION, SOLUTION INTRAVENOUS CONTINUOUS
Status: CANCELLED | OUTPATIENT
Start: 2020-10-07

## 2020-10-07 RX ORDER — CEFAZOLIN SODIUM IN 0.9 % NACL 3 G/100 ML
3 INTRAVENOUS SOLUTION, PIGGYBACK (ML) INTRAVENOUS ONCE
Status: CANCELLED | OUTPATIENT
Start: 2020-10-07 | End: 2020-10-07

## 2020-10-07 NOTE — H&P
Subjective   Nohemi Mesa is a 65 y.o. female.     History of present illness  Nohemi is a pleasant 65-year-old seen in the office today at the request of the folks at [facility] for symptomatic gallbladder disease with stones.  She is having a discomfort in the epigastrium that radiates to the back and at times under the right breast that radiates around.  She is having nausea after eating and occasionally some loose stool as well.    In the office today we have gone over a booklet outlining gallbladder disease.  We recommended that she consider a lap kitty.  We discussed the procedure and risks in detail.  She understands those accepts those and wishes to proceed.    Past Medical History:   Diagnosis Date   • Arthritis    • Cellulitis     left leg    • Cellulitis of left leg    • Coronary heart disease    • Diabetes mellitus (CMS/HCC)    • H/O neck surgery 02/22/2019   • History of type 2 diabetes mellitus    • Hypertension    • Obesity    • NURIA (obstructive sleep apnea)     MODERATE TO SEVERE- C-pap machine    • Pinched nerve    • Sinus arrhythmia    • Torn tendon     Right Foot       Past Surgical History:   Procedure Laterality Date   • CARDIAC CATHETERIZATION  08/17/2018    BHF 08/17/2018 SIGNIFCANT EPICARDIAL CORONARY ARTERY CALCIFICATIONS WITH ONLY LIMITED LUMINAL COMPROMISE INVOLVING A SMALL OBTUSE MARGINAL BRANCH.  MEDICAL MANAGEMENT AT THIS TIME   • CARPAL TUNNEL RELEASE      RIGHT/LEFT   • EYE SURGERY Right    • KNEE ARTHROSCOPY     • LAPAROSCOPIC GASTRIC BANDING      2008/FOLLOWED BY BAND REMOVAL 2011   • OTHER SURGICAL HISTORY      POST OP 02/22/2019  ACDF  C4-C7, VALE C6, DR STOREY   • TONSILLECTOMY     • TOTAL KNEE ARTHROPLASTY Left 2016   • TOTAL KNEE ARTHROPLASTY Right 2017       Outpatient Encounter Medications as of 10/7/2020   Medication Sig Dispense Refill   • buPROPion XL (WELLBUTRIN XL) 150 MG 24 hr tablet Take 150 mg by mouth Daily.     • diclofenac (VOLTAREN) 75 MG EC tablet TAKE 1 TABLET TWICE A  DAY AS NEEDED FOR PAIN 180 tablet 4   • furosemide (LASIX) 40 MG tablet Daily As Needed.     • glipizide (GLUCOTROL XL) 5 MG ER tablet 2 (Two) Times a Day.     • isosorbide mononitrate (IMDUR) 30 MG 24 hr tablet Take 1 tablet by mouth Daily. 90 tablet 3   • JANUVIA 100 MG tablet Take 100 mg by mouth Daily.     • lisinopril-hydrochlorothiazide (PRINZIDE,ZESTORETIC) 20-12.5 MG per tablet TAKE 1 TABLET DAILY     • metFORMIN ER (GLUCOPHAGE-XR) 500 MG 24 hr tablet Take 500 mg by mouth 2 (Two) Times a Day.     • modafinil (PROVIGIL) 100 MG tablet Take 100 mg by mouth Daily.     • omeprazole (priLOSEC) 40 MG capsule Take 40 mg by mouth 2 (Two) Times a Day.     • pregabalin (LYRICA) 100 MG capsule Take 100 mg by mouth Daily.     • sertraline (ZOLOFT) 50 MG tablet Daily.       No facility-administered encounter medications on file as of 10/7/2020.        No Known Allergies    Family History   Problem Relation Age of Onset   • Heart disease Mother         PCI/STENTS   • Hypertension Brother        Social History     Socioeconomic History   • Marital status:      Spouse name: Not on file   • Number of children: Not on file   • Years of education: Not on file   • Highest education level: Not on file   Tobacco Use   • Smoking status: Former Smoker     Packs/day: 1.00     Types: Cigarettes     Quit date: 10/1/1985     Years since quittin.0   • Smokeless tobacco: Never Used   Substance and Sexual Activity   • Alcohol use: No     Frequency: Never   • Drug use: No   • Sexual activity: Defer       The following portions of the patient's history were reviewed and updated as appropriate: allergies, current medications, past family history, past medical history, past social history, past surgical history and problem list.    Objective       Assessment/Plan   There are no diagnoses linked to this encounter.    Complete review of systems is done and unremarkable with exception of the chief complaint.  Physical exam shows  pleasant 65-year-old female.  HEENT is negative.  Heart regular lungs clear abdomen soft nontender without mass.  Extremities show equal range of motion in the upper and lower extremities.  She has symmetrical strength and usage.  Neuro shows no obvious focal deficit.    Impression: 65-year-old with symptomatic gallbladder disease with stones.    Recommendation: Mariana Lakhani DO  10/7/2020  14:54 EDT

## 2020-10-07 NOTE — PROGRESS NOTES
Subjective   Nohemi Mesa is a 65 y.o. female.     History of present illness  Nohemi is a pleasant 65-year-old seen in the office today at the request of the folks at [facility] for symptomatic gallbladder disease with stones.  She is having a discomfort in the epigastrium that radiates to the back and at times under the right breast that radiates around.  She is having nausea after eating and occasionally some loose stool as well.    In the office today we have gone over a booklet outlining gallbladder disease.  We recommended that she consider a lap kitty.  We discussed the procedure and risks in detail.  She understands those accepts those and wishes to proceed.    Past Medical History:   Diagnosis Date   • Arthritis    • Cellulitis     left leg    • Cellulitis of left leg    • Coronary heart disease    • Diabetes mellitus (CMS/HCC)    • H/O neck surgery 02/22/2019   • History of type 2 diabetes mellitus    • Hypertension    • Obesity    • NURIA (obstructive sleep apnea)     MODERATE TO SEVERE- C-pap machine    • Pinched nerve    • Sinus arrhythmia    • Torn tendon     Right Foot       Past Surgical History:   Procedure Laterality Date   • CARDIAC CATHETERIZATION  08/17/2018    BHF 08/17/2018 SIGNIFCANT EPICARDIAL CORONARY ARTERY CALCIFICATIONS WITH ONLY LIMITED LUMINAL COMPROMISE INVOLVING A SMALL OBTUSE MARGINAL BRANCH.  MEDICAL MANAGEMENT AT THIS TIME   • CARPAL TUNNEL RELEASE      RIGHT/LEFT   • EYE SURGERY Right    • KNEE ARTHROSCOPY     • LAPAROSCOPIC GASTRIC BANDING      2008/FOLLOWED BY BAND REMOVAL 2011   • OTHER SURGICAL HISTORY      POST OP 02/22/2019  ACDF  C4-C7, VALE C6, DR STOREY   • TONSILLECTOMY     • TOTAL KNEE ARTHROPLASTY Left 2016   • TOTAL KNEE ARTHROPLASTY Right 2017       Outpatient Encounter Medications as of 10/7/2020   Medication Sig Dispense Refill   • buPROPion XL (WELLBUTRIN XL) 150 MG 24 hr tablet Take 150 mg by mouth Daily.     • diclofenac (VOLTAREN) 75 MG EC tablet TAKE 1 TABLET TWICE A  DAY AS NEEDED FOR PAIN 180 tablet 4   • furosemide (LASIX) 40 MG tablet Daily As Needed.     • glipizide (GLUCOTROL XL) 5 MG ER tablet 2 (Two) Times a Day.     • isosorbide mononitrate (IMDUR) 30 MG 24 hr tablet Take 1 tablet by mouth Daily. 90 tablet 3   • JANUVIA 100 MG tablet Take 100 mg by mouth Daily.     • lisinopril-hydrochlorothiazide (PRINZIDE,ZESTORETIC) 20-12.5 MG per tablet TAKE 1 TABLET DAILY     • metFORMIN ER (GLUCOPHAGE-XR) 500 MG 24 hr tablet Take 500 mg by mouth 2 (Two) Times a Day.     • modafinil (PROVIGIL) 100 MG tablet Take 100 mg by mouth Daily.     • omeprazole (priLOSEC) 40 MG capsule Take 40 mg by mouth 2 (Two) Times a Day.     • pregabalin (LYRICA) 100 MG capsule Take 100 mg by mouth Daily.     • sertraline (ZOLOFT) 50 MG tablet Daily.       No facility-administered encounter medications on file as of 10/7/2020.        No Known Allergies    Family History   Problem Relation Age of Onset   • Heart disease Mother         PCI/STENTS   • Hypertension Brother        Social History     Socioeconomic History   • Marital status:      Spouse name: Not on file   • Number of children: Not on file   • Years of education: Not on file   • Highest education level: Not on file   Tobacco Use   • Smoking status: Former Smoker     Packs/day: 1.00     Types: Cigarettes     Quit date: 10/1/1985     Years since quittin.0   • Smokeless tobacco: Never Used   Substance and Sexual Activity   • Alcohol use: No     Frequency: Never   • Drug use: No   • Sexual activity: Defer       The following portions of the patient's history were reviewed and updated as appropriate: allergies, current medications, past family history, past medical history, past social history, past surgical history and problem list.    Objective       Assessment/Plan   There are no diagnoses linked to this encounter.    Complete review of systems is done and unremarkable with exception of the chief complaint.  Physical exam shows  pleasant 65-year-old female.  HEENT is negative.  Heart regular lungs clear abdomen soft nontender without mass.  Extremities show equal range of motion in the upper and lower extremities.  She has symmetrical strength and usage.  Neuro shows no obvious focal deficit.    Impression: 65-year-old with symptomatic gallbladder disease with stones.    Recommendation: Mariana Lakhani DO  10/7/2020  14:52 EDT

## 2020-12-08 ENCOUNTER — LAB (OUTPATIENT)
Dept: LAB | Facility: HOSPITAL | Age: 65
End: 2020-12-08

## 2020-12-08 ENCOUNTER — HOSPITAL ENCOUNTER (OUTPATIENT)
Dept: CARDIOLOGY | Facility: HOSPITAL | Age: 65
Discharge: HOME | End: 2020-12-08

## 2020-12-08 ENCOUNTER — HOSPITAL ENCOUNTER (OUTPATIENT)
Dept: GENERAL RADIOLOGY | Facility: HOSPITAL | Age: 65
Discharge: HOME | End: 2020-12-08

## 2020-12-08 DIAGNOSIS — K80.10 CHOLECYSTITIS WITH CHOLELITHIASIS: ICD-10-CM

## 2020-12-08 LAB
ALBUMIN SERPL-MCNC: 3.8 G/DL (ref 3.5–5.2)
ALBUMIN/GLOB SERPL: 1.2 G/DL
ALP SERPL-CCNC: 154 U/L (ref 39–117)
ALT SERPL W P-5'-P-CCNC: 6 U/L (ref 1–33)
ANION GAP SERPL CALCULATED.3IONS-SCNC: 7.1 MMOL/L (ref 5–15)
AST SERPL-CCNC: 12 U/L (ref 1–32)
BASOPHILS # BLD AUTO: 0.06 10*3/MM3 (ref 0–0.2)
BASOPHILS NFR BLD AUTO: 0.7 % (ref 0–1.5)
BILIRUB SERPL-MCNC: 0.2 MG/DL (ref 0–1.2)
BUN SERPL-MCNC: 13 MG/DL (ref 8–23)
BUN/CREAT SERPL: 14.3 (ref 7–25)
CALCIUM SPEC-SCNC: 9.1 MG/DL (ref 8.6–10.5)
CHLORIDE SERPL-SCNC: 99 MMOL/L (ref 98–107)
CO2 SERPL-SCNC: 29.9 MMOL/L (ref 22–29)
CREAT SERPL-MCNC: 0.91 MG/DL (ref 0.57–1)
DEPRECATED RDW RBC AUTO: 44 FL (ref 37–54)
EOSINOPHIL # BLD AUTO: 0.43 10*3/MM3 (ref 0–0.4)
EOSINOPHIL NFR BLD AUTO: 4.7 % (ref 0.3–6.2)
ERYTHROCYTE [DISTWIDTH] IN BLOOD BY AUTOMATED COUNT: 15.3 % (ref 12.3–15.4)
GFR SERPL CREATININE-BSD FRML MDRD: 62 ML/MIN/1.73
GLOBULIN UR ELPH-MCNC: 3.1 GM/DL
GLUCOSE SERPL-MCNC: 108 MG/DL (ref 65–99)
HCT VFR BLD AUTO: 32.1 % (ref 34–46.6)
HGB BLD-MCNC: 9.9 G/DL (ref 12–15.9)
IMM GRANULOCYTES # BLD AUTO: 0.07 10*3/MM3 (ref 0–0.05)
IMM GRANULOCYTES NFR BLD AUTO: 0.8 % (ref 0–0.5)
LYMPHOCYTES # BLD AUTO: 2 10*3/MM3 (ref 0.7–3.1)
LYMPHOCYTES NFR BLD AUTO: 22 % (ref 19.6–45.3)
MCH RBC QN AUTO: 24.9 PG (ref 26.6–33)
MCHC RBC AUTO-ENTMCNC: 30.8 G/DL (ref 31.5–35.7)
MCV RBC AUTO: 80.9 FL (ref 79–97)
MONOCYTES # BLD AUTO: 0.75 10*3/MM3 (ref 0.1–0.9)
MONOCYTES NFR BLD AUTO: 8.3 % (ref 5–12)
NEUTROPHILS NFR BLD AUTO: 5.78 10*3/MM3 (ref 1.7–7)
NEUTROPHILS NFR BLD AUTO: 63.5 % (ref 42.7–76)
NRBC BLD AUTO-RTO: 0 /100 WBC (ref 0–0.2)
PLATELET # BLD AUTO: 293 10*3/MM3 (ref 140–450)
PMV BLD AUTO: 10.5 FL (ref 6–12)
POTASSIUM SERPL-SCNC: 4.9 MMOL/L (ref 3.5–5.2)
PROT SERPL-MCNC: 6.9 G/DL (ref 6–8.5)
RBC # BLD AUTO: 3.97 10*6/MM3 (ref 3.77–5.28)
SODIUM SERPL-SCNC: 136 MMOL/L (ref 136–145)
WBC # BLD AUTO: 9.09 10*3/MM3 (ref 3.4–10.8)

## 2020-12-08 PROCEDURE — 93010 ELECTROCARDIOGRAM REPORT: CPT | Performed by: INTERNAL MEDICINE

## 2020-12-08 PROCEDURE — 36415 COLL VENOUS BLD VENIPUNCTURE: CPT

## 2020-12-08 PROCEDURE — 85025 COMPLETE CBC W/AUTO DIFF WBC: CPT

## 2020-12-08 PROCEDURE — 80053 COMPREHEN METABOLIC PANEL: CPT

## 2020-12-08 PROCEDURE — 93005 ELECTROCARDIOGRAM TRACING: CPT | Performed by: SURGERY

## 2020-12-08 PROCEDURE — 71046 X-RAY EXAM CHEST 2 VIEWS: CPT

## 2020-12-09 NOTE — PAT
Secure chat sent to Dr. Lakhani about hgb = 9.9, Lissette Mccollum said he is on vacation, wont be back until DOS.

## 2020-12-11 ENCOUNTER — LAB (OUTPATIENT)
Dept: LAB | Facility: HOSPITAL | Age: 65
End: 2020-12-11

## 2020-12-11 PROCEDURE — U0004 COV-19 TEST NON-CDC HGH THRU: HCPCS

## 2020-12-11 PROCEDURE — C9803 HOPD COVID-19 SPEC COLLECT: HCPCS

## 2020-12-12 LAB — SARS-COV-2 RNA RESP QL NAA+PROBE: NOT DETECTED

## 2020-12-14 ENCOUNTER — ANESTHESIA EVENT (OUTPATIENT)
Dept: PERIOP | Facility: HOSPITAL | Age: 65
End: 2020-12-14

## 2020-12-14 ENCOUNTER — ANESTHESIA (OUTPATIENT)
Dept: PERIOP | Facility: HOSPITAL | Age: 65
End: 2020-12-14

## 2020-12-14 ENCOUNTER — HOSPITAL ENCOUNTER (OUTPATIENT)
Facility: HOSPITAL | Age: 65
Setting detail: HOSPITAL OUTPATIENT SURGERY
Discharge: HOME | End: 2020-12-14
Attending: SURGERY | Admitting: SURGERY

## 2020-12-14 VITALS
DIASTOLIC BLOOD PRESSURE: 64 MMHG | RESPIRATION RATE: 16 BRPM | HEART RATE: 69 BPM | TEMPERATURE: 97.9 F | HEIGHT: 65 IN | SYSTOLIC BLOOD PRESSURE: 146 MMHG | BODY MASS INDEX: 43.89 KG/M2 | OXYGEN SATURATION: 95 % | WEIGHT: 263.45 LBS

## 2020-12-14 DIAGNOSIS — K80.18 CALCULUS OF GALLBLADDER WITH CHOLECYSTITIS OF OTHER ACUITY WITHOUT OBSTRUCTION: ICD-10-CM

## 2020-12-14 DIAGNOSIS — K80.10 CHOLECYSTITIS WITH CHOLELITHIASIS: ICD-10-CM

## 2020-12-14 LAB
ABO GROUP BLD: NORMAL
BLD GP AB SCN SERPL QL: NEGATIVE
GLUCOSE BLDC GLUCOMTR-MCNC: 152 MG/DL (ref 70–105)
GLUCOSE BLDC GLUCOMTR-MCNC: 181 MG/DL (ref 70–105)
HCT VFR BLD AUTO: 29.2 % (ref 34–46.6)
HGB BLD-MCNC: 9.2 G/DL (ref 12–15.9)
RH BLD: POSITIVE
T&S EXPIRATION DATE: NORMAL

## 2020-12-14 PROCEDURE — 88304 TISSUE EXAM BY PATHOLOGIST: CPT | Performed by: SURGERY

## 2020-12-14 PROCEDURE — 47562 LAPAROSCOPIC CHOLECYSTECTOMY: CPT | Performed by: SURGERY

## 2020-12-14 PROCEDURE — 85018 HEMOGLOBIN: CPT | Performed by: SURGERY

## 2020-12-14 PROCEDURE — 25010000002 DEXAMETHASONE PER 1 MG: Performed by: NURSE ANESTHETIST, CERTIFIED REGISTERED

## 2020-12-14 PROCEDURE — 25010000002 ONDANSETRON PER 1 MG: Performed by: NURSE ANESTHETIST, CERTIFIED REGISTERED

## 2020-12-14 PROCEDURE — 47562 LAPAROSCOPIC CHOLECYSTECTOMY: CPT | Performed by: REGISTERED NURSE

## 2020-12-14 PROCEDURE — 86901 BLOOD TYPING SEROLOGIC RH(D): CPT | Performed by: SURGERY

## 2020-12-14 PROCEDURE — 25010000002 PROPOFOL 10 MG/ML EMULSION: Performed by: NURSE ANESTHETIST, CERTIFIED REGISTERED

## 2020-12-14 PROCEDURE — 86900 BLOOD TYPING SEROLOGIC ABO: CPT

## 2020-12-14 PROCEDURE — 85014 HEMATOCRIT: CPT | Performed by: SURGERY

## 2020-12-14 PROCEDURE — 25010000002 HEPARIN (PORCINE) PER 1000 UNITS: Performed by: SURGERY

## 2020-12-14 PROCEDURE — 25010000002 HYDROMORPHONE PER 4 MG: Performed by: NURSE ANESTHETIST, CERTIFIED REGISTERED

## 2020-12-14 PROCEDURE — 86850 RBC ANTIBODY SCREEN: CPT | Performed by: SURGERY

## 2020-12-14 PROCEDURE — 86901 BLOOD TYPING SEROLOGIC RH(D): CPT

## 2020-12-14 PROCEDURE — 25010000002 FENTANYL CITRATE (PF) 100 MCG/2ML SOLUTION: Performed by: NURSE ANESTHETIST, CERTIFIED REGISTERED

## 2020-12-14 PROCEDURE — 82962 GLUCOSE BLOOD TEST: CPT

## 2020-12-14 PROCEDURE — 86900 BLOOD TYPING SEROLOGIC ABO: CPT | Performed by: SURGERY

## 2020-12-14 DEVICE — LIGACLIP 10-M/L, 10MM ENDOSCOPIC ROTATING MULTIPLE CLIP APPLIERS
Type: IMPLANTABLE DEVICE | Site: ABDOMEN | Status: FUNCTIONAL
Brand: LIGACLIP

## 2020-12-14 RX ORDER — PHENYLEPHRINE HCL IN 0.9% NACL 0.5 MG/5ML
SYRINGE (ML) INTRAVENOUS AS NEEDED
Status: DISCONTINUED | OUTPATIENT
Start: 2020-12-14 | End: 2020-12-14 | Stop reason: SURG

## 2020-12-14 RX ORDER — GLYCOPYRROLATE 1 MG/5 ML
SYRINGE (ML) INTRAVENOUS AS NEEDED
Status: DISCONTINUED | OUTPATIENT
Start: 2020-12-14 | End: 2020-12-14 | Stop reason: SURG

## 2020-12-14 RX ORDER — PROPOFOL 10 MG/ML
VIAL (ML) INTRAVENOUS AS NEEDED
Status: DISCONTINUED | OUTPATIENT
Start: 2020-12-14 | End: 2020-12-14 | Stop reason: SURG

## 2020-12-14 RX ORDER — LIDOCAINE HYDROCHLORIDE 20 MG/ML
INJECTION, SOLUTION EPIDURAL; INFILTRATION; INTRACAUDAL; PERINEURAL AS NEEDED
Status: DISCONTINUED | OUTPATIENT
Start: 2020-12-14 | End: 2020-12-14 | Stop reason: SURG

## 2020-12-14 RX ORDER — BUPIVACAINE HYDROCHLORIDE 2.5 MG/ML
INJECTION, SOLUTION EPIDURAL; INFILTRATION; INTRACAUDAL AS NEEDED
Status: DISCONTINUED | OUTPATIENT
Start: 2020-12-14 | End: 2020-12-14 | Stop reason: HOSPADM

## 2020-12-14 RX ORDER — ACETAMINOPHEN 325 MG/1
650 TABLET ORAL ONCE AS NEEDED
Status: DISCONTINUED | OUTPATIENT
Start: 2020-12-14 | End: 2020-12-14 | Stop reason: HOSPADM

## 2020-12-14 RX ORDER — CEFAZOLIN SODIUM IN 0.9 % NACL 3 G/100 ML
3 INTRAVENOUS SOLUTION, PIGGYBACK (ML) INTRAVENOUS ONCE
Status: COMPLETED | OUTPATIENT
Start: 2020-12-14 | End: 2020-12-14

## 2020-12-14 RX ORDER — OXYCODONE HYDROCHLORIDE 5 MG/1
7.5 TABLET ORAL ONCE AS NEEDED
Status: COMPLETED | OUTPATIENT
Start: 2020-12-14 | End: 2020-12-14

## 2020-12-14 RX ORDER — ONDANSETRON 2 MG/ML
4 INJECTION INTRAMUSCULAR; INTRAVENOUS ONCE AS NEEDED
Status: DISCONTINUED | OUTPATIENT
Start: 2020-12-14 | End: 2020-12-14 | Stop reason: HOSPADM

## 2020-12-14 RX ORDER — FENTANYL CITRATE 50 UG/ML
INJECTION, SOLUTION INTRAMUSCULAR; INTRAVENOUS AS NEEDED
Status: DISCONTINUED | OUTPATIENT
Start: 2020-12-14 | End: 2020-12-14 | Stop reason: SURG

## 2020-12-14 RX ORDER — NEOSTIGMINE METHYLSULFATE 5 MG/5 ML
SYRINGE (ML) INTRAVENOUS AS NEEDED
Status: DISCONTINUED | OUTPATIENT
Start: 2020-12-14 | End: 2020-12-14 | Stop reason: SURG

## 2020-12-14 RX ORDER — DEXAMETHASONE SODIUM PHOSPHATE 4 MG/ML
INJECTION, SOLUTION INTRA-ARTICULAR; INTRALESIONAL; INTRAMUSCULAR; INTRAVENOUS; SOFT TISSUE AS NEEDED
Status: DISCONTINUED | OUTPATIENT
Start: 2020-12-14 | End: 2020-12-14 | Stop reason: SURG

## 2020-12-14 RX ORDER — SODIUM CHLORIDE 9 MG/ML
100 INJECTION, SOLUTION INTRAVENOUS CONTINUOUS
Status: DISCONTINUED | OUTPATIENT
Start: 2020-12-14 | End: 2020-12-14 | Stop reason: HOSPADM

## 2020-12-14 RX ORDER — ACETAMINOPHEN 650 MG/1
650 SUPPOSITORY RECTAL ONCE AS NEEDED
Status: DISCONTINUED | OUTPATIENT
Start: 2020-12-14 | End: 2020-12-14 | Stop reason: HOSPADM

## 2020-12-14 RX ORDER — ROCURONIUM BROMIDE 10 MG/ML
INJECTION, SOLUTION INTRAVENOUS AS NEEDED
Status: DISCONTINUED | OUTPATIENT
Start: 2020-12-14 | End: 2020-12-14 | Stop reason: SURG

## 2020-12-14 RX ORDER — SODIUM CHLORIDE, SODIUM LACTATE, POTASSIUM CHLORIDE, CALCIUM CHLORIDE 600; 310; 30; 20 MG/100ML; MG/100ML; MG/100ML; MG/100ML
INJECTION, SOLUTION INTRAVENOUS CONTINUOUS PRN
Status: DISCONTINUED | OUTPATIENT
Start: 2020-12-14 | End: 2020-12-14 | Stop reason: SURG

## 2020-12-14 RX ORDER — EPHEDRINE SULFATE/0.9% NACL/PF 25 MG/5 ML
SYRINGE (ML) INTRAVENOUS AS NEEDED
Status: DISCONTINUED | OUTPATIENT
Start: 2020-12-14 | End: 2020-12-14 | Stop reason: SURG

## 2020-12-14 RX ORDER — ONDANSETRON 2 MG/ML
INJECTION INTRAMUSCULAR; INTRAVENOUS AS NEEDED
Status: DISCONTINUED | OUTPATIENT
Start: 2020-12-14 | End: 2020-12-14 | Stop reason: SURG

## 2020-12-14 RX ORDER — HYDROCODONE BITARTRATE AND ACETAMINOPHEN 7.5; 325 MG/1; MG/1
1 TABLET ORAL EVERY 4 HOURS PRN
Qty: 30 TABLET | Refills: 0 | Status: SHIPPED | OUTPATIENT
Start: 2020-12-14

## 2020-12-14 RX ORDER — HYDROMORPHONE HCL 110MG/55ML
0.25 PATIENT CONTROLLED ANALGESIA SYRINGE INTRAVENOUS
Status: DISCONTINUED | OUTPATIENT
Start: 2020-12-14 | End: 2020-12-14 | Stop reason: HOSPADM

## 2020-12-14 RX ADMIN — DEXAMETHASONE SODIUM PHOSPHATE 8 MG: 4 INJECTION, SOLUTION INTRAMUSCULAR; INTRAVENOUS at 08:01

## 2020-12-14 RX ADMIN — Medication 0.8 MG: at 08:40

## 2020-12-14 RX ADMIN — PROPOFOL 150 MG: 10 INJECTION, EMULSION INTRAVENOUS at 07:56

## 2020-12-14 RX ADMIN — Medication 4 MG: at 08:40

## 2020-12-14 RX ADMIN — SODIUM CHLORIDE, SODIUM LACTATE, POTASSIUM CHLORIDE, AND CALCIUM CHLORIDE: .6; .31; .03; .02 INJECTION, SOLUTION INTRAVENOUS at 07:48

## 2020-12-14 RX ADMIN — FENTANYL CITRATE 50 MCG: 50 INJECTION, SOLUTION INTRAMUSCULAR; INTRAVENOUS at 07:50

## 2020-12-14 RX ADMIN — Medication 20 MG: at 08:16

## 2020-12-14 RX ADMIN — OXYCODONE HYDROCHLORIDE 7.5 MG: 5 TABLET ORAL at 09:44

## 2020-12-14 RX ADMIN — PHENYLEPHRINE HYDROCHLORIDE 100 MCG: 10 INJECTION INTRAVENOUS at 08:10

## 2020-12-14 RX ADMIN — ROCURONIUM BROMIDE 40 MG: 10 INJECTION, SOLUTION INTRAVENOUS at 07:56

## 2020-12-14 RX ADMIN — PHENYLEPHRINE HYDROCHLORIDE 100 MCG: 10 INJECTION INTRAVENOUS at 08:04

## 2020-12-14 RX ADMIN — HYDROMORPHONE HYDROCHLORIDE 0.25 MG: 2 INJECTION, SOLUTION INTRAMUSCULAR; INTRAVENOUS; SUBCUTANEOUS at 09:58

## 2020-12-14 RX ADMIN — ONDANSETRON 4 MG: 2 INJECTION INTRAMUSCULAR; INTRAVENOUS at 08:37

## 2020-12-14 RX ADMIN — PHENYLEPHRINE HYDROCHLORIDE 100 MCG: 10 INJECTION INTRAVENOUS at 08:07

## 2020-12-14 RX ADMIN — CEFAZOLIN 3 G: 1 INJECTION, POWDER, FOR SOLUTION INTRAMUSCULAR; INTRAVENOUS; PARENTERAL at 08:00

## 2020-12-14 RX ADMIN — HYDROMORPHONE HYDROCHLORIDE 0.25 MG: 2 INJECTION, SOLUTION INTRAMUSCULAR; INTRAVENOUS; SUBCUTANEOUS at 09:40

## 2020-12-14 RX ADMIN — LIDOCAINE HYDROCHLORIDE 100 MG: 20 INJECTION, SOLUTION EPIDURAL; INFILTRATION; INTRACAUDAL; PERINEURAL at 07:56

## 2020-12-14 RX ADMIN — FENTANYL CITRATE 50 MCG: 50 INJECTION, SOLUTION INTRAMUSCULAR; INTRAVENOUS at 08:31

## 2020-12-14 RX ADMIN — PHENYLEPHRINE HYDROCHLORIDE 100 MCG: 10 INJECTION INTRAVENOUS at 08:13

## 2020-12-14 NOTE — H&P
Subjective   Nohemi Mesa is a 65 y.o. female.     History of present illness  Nohemi is a pleasant 65-year-old female seen in the office at the request of the folks at [facility] for symptomatic gallbladder disease with stones.  Been having discomfort in the epigastrium that radiates to her back at times under her right breast.  She is having nausea with eating and occasionally some loose stool as well.  We discussed lap kitty in detail.  We have gone over a booklet outlining the procedure and risks.  She understands those accepts those and wishes to proceed.        Past Medical History:   Diagnosis Date   • Arthritis    • Cellulitis     left leg    • Cellulitis of left leg    • Coronary heart disease    • Diabetes mellitus (CMS/HCC)    • H/O neck surgery 02/22/2019   • History of type 2 diabetes mellitus    • Hypertension    • Obesity    • NURIA (obstructive sleep apnea)     MODERATE TO SEVERE- C-pap machine    • Pinched nerve    • PONV (postoperative nausea and vomiting)    • Sinus arrhythmia    • Torn tendon     Right Foot       Past Surgical History:   Procedure Laterality Date   • CARDIAC CATHETERIZATION  08/17/2018    BHF 08/17/2018 SIGNIFCANT EPICARDIAL CORONARY ARTERY CALCIFICATIONS WITH ONLY LIMITED LUMINAL COMPROMISE INVOLVING A SMALL OBTUSE MARGINAL BRANCH.  MEDICAL MANAGEMENT AT THIS TIME   • CARPAL TUNNEL RELEASE      RIGHT/LEFT   • EYE SURGERY Right    • JOINT REPLACEMENT      right shoulder    • KNEE ARTHROSCOPY     • LAPAROSCOPIC GASTRIC BANDING      2008/FOLLOWED BY BAND REMOVAL 2011   • OTHER SURGICAL HISTORY      POST OP 02/22/2019  ACDF  C4-C7, VALE C6, DR STOREY   • TONSILLECTOMY     • TOTAL KNEE ARTHROPLASTY Left 2016   • TOTAL KNEE ARTHROPLASTY Right 2017       [unfilled]    No Known Allergies    Family History   Problem Relation Age of Onset   • Heart disease Mother         PCI/STENTS   • Hypertension Brother        Social History     Socioeconomic History   • Marital status:      Spouse name:  Not on file   • Number of children: Not on file   • Years of education: Not on file   • Highest education level: Not on file   Tobacco Use   • Smoking status: Former Smoker     Packs/day: 1.00     Types: Cigarettes     Quit date: 10/1/1985     Years since quittin.2   • Smokeless tobacco: Never Used   Substance and Sexual Activity   • Alcohol use: No     Frequency: Never   • Drug use: No   • Sexual activity: Defer       The following portions of the patient's history were reviewed and updated as appropriate: allergies, current medications, past family history, past medical history, past social history, past surgical history and problem list.    Objective   Complete review of systems is done and unremarkable exception the chief complaint    Physical exam shows a pleasant obese 65-year-old female.  HEENT is negative.  Heart regular.  Lungs are clear.  Abdomen is obese.  Nontender.  No palpable mass.  Extremities show equal range of motion in the upper and lower extremities.  She has symmetrical strength and usage.  Neuro shows no obvious focal deficit.    Impression: Cholecystitis cholelithiasis    Plan: Laparoscopic cholecystectomy    Assessment/Plan                  Geoff Lakhani DO  2020  07:36 EST

## 2020-12-14 NOTE — ANESTHESIA PROCEDURE NOTES
Airway  Date/Time: 12/14/2020 7:59 AM  Airway not difficult    General Information and Staff    Patient location during procedure: OR  Anesthesiologist: Lg Tamez MD  CRNA: Jolene Perry CRNA    Indications and Patient Condition  Indications for airway management: airway protection    Preoxygenated: yes  Mask difficulty assessment: 2 - vent by mask + OA or adjuvant +/- NMBA    Final Airway Details  Final airway type: endotracheal airway      Successful airway: ETT  Cuffed: yes   Successful intubation technique: video laryngoscopy  Facilitating devices/methods: intubating stylet  Endotracheal tube insertion site: oral  Blade: Zaira  Blade size: 3  ETT size (mm): 7.5  Cormack-Lehane Classification: grade I - full view of glottis  Placement verified by: chest auscultation and capnometry   Measured from: lips  ETT/EBT  to lips (cm): 21  Number of attempts at approach: 1  Assessment: lips, teeth, and gum same as pre-op and atraumatic intubation

## 2020-12-14 NOTE — ANESTHESIA POSTPROCEDURE EVALUATION
Patient: Nohemi Mesa    Procedure Summary     Date: 12/14/20 Room / Location: [facility] OR 08 / [facility] MAIN OR    Anesthesia Start: 0748 Anesthesia Stop: 0857    Procedure: CHOLECYSTECTOMY LAPAROSCOPIC (N/A Abdomen) Diagnosis:       Cholecystitis with cholelithiasis      (Cholecystitis with cholelithiasis [K80.10])    Surgeon: Geoff Lakhani DO Provider: Lg Tamez MD    Anesthesia Type: general ASA Status: 3          Anesthesia Type: general    Vitals  Vitals Value Taken Time   /58 12/14/20 1011   Temp 98.4 °F (36.9 °C) 12/14/20 1011   Pulse 76 12/14/20 1013   Resp 16 12/14/20 1011   SpO2 93 % 12/14/20 1013   Vitals shown include unvalidated device data.        Post Anesthesia Care and Evaluation    Pain management: adequate  Airway patency: patent  Anesthetic complications: No anesthetic complications  PONV Status: controlled  Cardiovascular status: acceptable  Respiratory status: acceptable  Hydration status: acceptable

## 2020-12-14 NOTE — ANESTHESIA PREPROCEDURE EVALUATION
Anesthesia Evaluation     Patient summary reviewed and Nursing notes reviewed   history of anesthetic complications: PONV  NPO Solid Status: > 8 hours  NPO Liquid Status: > 8 hours           Airway   Mallampati: III  TM distance: <3 FB  Neck ROM: full  Possible difficult intubation, Small opening and Large neck circumference  Dental      Pulmonary    (+) sleep apnea,   Cardiovascular     (+) hypertension, CAD,       Neuro/Psych  GI/Hepatic/Renal/Endo    (+)   diabetes mellitus,     Musculoskeletal     Abdominal    Substance History      OB/GYN          Other   arthritis,          Phys Exam Other: Le edema                Anesthesia Plan    ASA 3     general   (Glydescope)  intravenous induction     Anesthetic plan, all risks, benefits, and alternatives have been provided, discussed and informed consent has been obtained with: patient.

## 2020-12-15 LAB
LAB AP CASE REPORT: NORMAL
PATH REPORT.FINAL DX SPEC: NORMAL
PATH REPORT.GROSS SPEC: NORMAL

## 2020-12-29 ENCOUNTER — OFFICE VISIT (OUTPATIENT)
Dept: SURGERY | Facility: CLINIC | Age: 65
End: 2020-12-29

## 2020-12-29 VITALS
TEMPERATURE: 96.9 F | HEART RATE: 71 BPM | DIASTOLIC BLOOD PRESSURE: 93 MMHG | SYSTOLIC BLOOD PRESSURE: 147 MMHG | HEIGHT: 65 IN | WEIGHT: 262.4 LBS | BODY MASS INDEX: 43.72 KG/M2 | OXYGEN SATURATION: 98 %

## 2020-12-29 DIAGNOSIS — K80.10 CALCULUS OF GALLBLADDER WITH CHRONIC CHOLECYSTITIS WITHOUT OBSTRUCTION: Primary | ICD-10-CM

## 2020-12-29 LAB — QT INTERVAL: 392 MS

## 2020-12-29 PROCEDURE — 99024 POSTOP FOLLOW-UP VISIT: CPT | Performed by: SURGERY

## 2020-12-29 NOTE — PROGRESS NOTES
SUBJECTIVE:    Nohemi is seen in the office today follow-up from her lap kitty 2 weeks ago.  She is doing well.  No fevers or chills.  No nausea or vomiting.    OBJECTIVE:    Incisions are healing appropriately without infection    ASSESSMENT:    Satisfactory postop progress    PLAN:    Recheck in our office as needed

## 2022-02-16 ENCOUNTER — OFFICE (AMBULATORY)
Dept: URBAN - METROPOLITAN AREA CLINIC 64 | Facility: CLINIC | Age: 67
End: 2022-02-16

## 2022-02-16 VITALS
SYSTOLIC BLOOD PRESSURE: 120 MMHG | HEIGHT: 65 IN | WEIGHT: 257 LBS | DIASTOLIC BLOOD PRESSURE: 71 MMHG | HEART RATE: 83 BPM

## 2022-02-16 DIAGNOSIS — R19.7 DIARRHEA, UNSPECIFIED: ICD-10-CM

## 2022-02-16 PROCEDURE — 99213 OFFICE O/P EST LOW 20 MIN: CPT | Performed by: NURSE PRACTITIONER

## 2022-02-16 RX ORDER — DICYCLOMINE HYDROCHLORIDE 10 MG/1
30 CAPSULE ORAL
Qty: 90 | Refills: 11 | Status: ACTIVE
Start: 2022-02-16

## 2022-02-16 RX ORDER — OMEPRAZOLE 40 MG/1
80 CAPSULE, DELAYED RELEASE ORAL
Qty: 180 | Refills: 4 | Status: ACTIVE
Start: 2020-03-13

## 2023-07-12 NOTE — OP NOTE
CHOLECYSTECTOMY LAPAROSCOPIC  Procedure Report    Patient Name:  Nohemi Mesa  YOB: 1955    Date of Surgery:  12/14/2020     Indications: Cholecystitis cholelithiasis    Pre-op Diagnosis:   Cholecystitis with cholelithiasis [K80.10]       Post-Op Diagnosis Codes:     * Cholecystitis with cholelithiasis [K80.10]    Procedure/CPT® Codes:      Procedure(s):  CHOLECYSTECTOMY LAPAROSCOPIC    Staff:  Surgeon(s):  Geoff Lakhani DO    Assistant: Chary Cordero RNFA    Anesthesia: General    Anesthetist: Jolene Perry CRNA    Estimated Blood Loss: minimal    Implants:    Implant Name Type Inv. Item Serial No.  Lot No. LRB No. Used Action   CLIPAPPLR M/ ENDO LIGACLIP ROT 10MM MD/TED - [id number] Implant CLIPAPPLR M/ ENDO LIGACLIP ROT 10MM MD/TED  [employer]  DIV OF J AND J [id number] N/A 1 Implanted       Specimen:          Specimens     ID Source Type Tests Collected By Collected At Frozen?      A Gallbladder Tissue · TISSUE PATHOLOGY EXAM   Geoff Lakhani DO 12/14/20 0733      Description: gallbladder    This specimen was not marked as sent.              Findings: Cholecystitis cholelithiasis, fatty liver    Complications: None    Description of Procedure: Ms. Willoughby is a 65-year-old seen in the office with symptomatic gallbladder disease with stones at request of the folks at [facility].  We recommended that she consider a lap kitty.  We discussed the procedure and risks in detail.  She understands those accepts those and wishes to proceed.    Patient was taken to the operating room placed in the supine position.  General is done by Jolene Perry CRNA and covering anesthesiologist.  Timeout done and identity verified.  Abdomen is prepped and draped after 3-minute dry time.  She has an umbilical mesh so we made our first cut in the left upper quadrant and made a small stab wound and passed a varies needle.  Saline drop test was done was negative and the abdomen insufflated with CO2 to  approximately 15 mmHg pressure.  A 5 mm port is in place in the left upper abdomen and the camera introduced.  She did have stuff stuck to the mesh as expected.  There was no bowel in the vicinity of where we needed to place her trocar so under direct vision the 11 mm port was placed in the supraumbilical position through the mesh.  We then switched to the 10 camera and then the other 3 ports were placed under direct vision.  The gallbladder was grasped at its fundus and retracted in a cephalad manner.  She had a very large fatty liver.  And placing traction on the gallbladder the gallbladder did rupture spilling bile but no stones.  The area near Perez's pouch was grasped tenting the cholecystoduodenal ligament.  This area was encased in fat and we carefully dissected through all the fat to identify the cystic duct.  It was traced to its junction with the gallbladder then 2 clips were placed proximally to distally and the duct divided.  Artery was likewise identified clipped and divided and the gallbladder removed from the liver bed using J hook electrocautery.  It was placed in an Endo Catch bag and retrieved out the subxiphoid incision.  Liver bed was then inspected it was found to be hemostatic with no bile leak.  Right upper quadrant was then irrigated with a liter of sterile fluid and suctioned free of bile.  One final survey was done of the liver bed it looked good and we then used an [employer] suture passer and a 0 Vicryl stitch was placed through the fascia at the subxiphoid port.  We did not place one through the 11 mm port through the mesh.  All ports were removed allowing CO2 to escape to the atmosphere.  Fascial stitch was tied down and skin closed with 4-0 Vicryl in a subcu manner throughout.  Sterile dressings of Mastisol and Steri-Strips were and Tegaderm were placed.  She was awakened and transferred to recovery in satisfactory condition.  The final sponge instrument and needle counts were  correct.    Assistant: Chary Cordero RNFA  was responsible for performing the following activities: Suturing, Closing, Placing Dressing and Held/Positioned Camera and their skilled assistance was necessary for the success of this case.    Geoff Lakhani,      Date: 12/14/2020  Time: 08:45 EST     Preseptal cellulitis of left eye

## 2024-03-21 ENCOUNTER — OFFICE VISIT (OUTPATIENT)
Dept: SLEEP MEDICINE | Facility: CLINIC | Age: 69
End: 2024-03-21
Payer: COMMERCIAL

## 2024-03-21 VITALS
BODY MASS INDEX: 44.65 KG/M2 | OXYGEN SATURATION: 95 % | RESPIRATION RATE: 12 BRPM | SYSTOLIC BLOOD PRESSURE: 106 MMHG | DIASTOLIC BLOOD PRESSURE: 66 MMHG | HEART RATE: 79 BPM | HEIGHT: 65 IN | WEIGHT: 268 LBS

## 2024-03-21 DIAGNOSIS — G47.33 OBSTRUCTIVE SLEEP APNEA, ADULT: Primary | ICD-10-CM

## 2024-03-21 DIAGNOSIS — G47.19 EXCESSIVE DAYTIME SLEEPINESS: ICD-10-CM

## 2024-03-21 DIAGNOSIS — E66.01 SEVERE OBESITY (BMI >= 40): ICD-10-CM

## 2024-03-21 PROCEDURE — G0463 HOSPITAL OUTPT CLINIC VISIT: HCPCS

## 2024-03-21 RX ORDER — LINAGLIPTIN 5 MG/1
1 TABLET, FILM COATED ORAL DAILY
COMMUNITY
Start: 2024-01-16

## 2024-03-21 RX ORDER — INSULIN LISPRO 100 [IU]/ML
INJECTION, SOLUTION INTRAVENOUS; SUBCUTANEOUS
COMMUNITY
Start: 2024-02-19

## 2024-03-21 RX ORDER — PRAMIPEXOLE DIHYDROCHLORIDE 0.25 MG/1
1 TABLET ORAL 3 TIMES DAILY
COMMUNITY
Start: 2024-02-28

## 2024-03-21 RX ORDER — ATORVASTATIN CALCIUM 10 MG/1
10 TABLET, FILM COATED ORAL NIGHTLY
COMMUNITY
Start: 2024-03-06

## 2024-03-21 NOTE — PROGRESS NOTES
[facility]  [address] [place] [id number]  Phone   Fax       Nohemi Mesa  [id number]   1955  68 y.o.  female      Referring Provider: Corrine Orantes MD  PCP: Corrine Orantes MD    Type of service: Initial Sleep Medicine Consult  Date of service: 3/21/2024          CHIEF COMPLAINT: Obstructive sleep apnea      HISTORY OF PRESENT ILLNESS:  Thank you for asking us to see Nohemi Mesa.  Nohemi Mesa 68 y.o. was seen today on 3/21/2024 at [facility].  Patient presents today to establish care for treatment and management of obstructive sleep apnea.  She initially had a sleep study in 2005 showing AHI of 20.9/h.  Unsure of weight at that time.  She underwent titration study and was started on CPAP at 9 cm H2O 7/2005.  She had another titration study 3/2016 at which point ideal pressure was 13 cm H2O and auto CPAP at 9 to 17 cm H2O was recommended.  Weight was 263 pounds at that time.  She is not sure if she ever got a device at that time.  She did have issues meeting insurance compliance in the past.  She continues to have symptoms of sleep apnea and multiple risk factors.        SLEEP HISTORY:  Sleep schedule:  Bedtime: Varies, may be midnight to 2 AM pending on how long she naps that day  Wake time: 7 to 9 AM  Time it takes to fall asleep: 30 minutes  Average hours of sleep: 4-5 weeknights, 6-7 weekends  Number of naps per day: 1-2    Symptoms:   In addition to the above, patient reports the following associated symptoms:  Have you ever awakened gasping for breath, coughing, choking: No   Change in weight: Gained 30 pounds  Morning headaches:  Yes   Awaken with a sore throat or dry mouth:  Yes   Leg jerking at night:  Yes   Creepy crawly feeling in legs/urge to move legs: Yes   Teeth grinding: Yes   Have you ever awakened at night with a sour taste or burning sensation in your chest:  Yes   Do you have muscle weakness  "with laughing or anger:  No   Have you ever felt paralyzed while going to sleep or waking up:  No   Sleepwalking: No   Nightmares: No   Nocturia (urination at night): 1-2 times per night    Medical Conditions (PMH):   Heart disease  Hypertension  Diabetes  Arthritis  Poor memory  Anxiety/depression    Social history:  Do you drive a commercial vehicle:  No   Shift work:  No   Tobacco use:  No   Alcohol use: 0 per week  Caffeinated drinks: 2 per day  Occupation: Retired but works part-time     Family History (parents and siblings) (pertaining to sleep medicine):  Heart disease  Hypertension    Medications: reviewed    Allergies:  Patient has no known allergies.      REVIEW OF SYSTEMS:  Pertinent positive symptoms are:  [place] Sleepiness Scale of Total score: 22   Fatigue  Frequent urination  Depression  Heartburn  Joint pain  Back pain      PHYSICAL EXAM:  CONSTITUTINONAL:   Vitals:    03/21/24 1424   BP: 106/66   BP Location: Left arm   Patient Position: Sitting   Cuff Size: Adult   Pulse: 79   Resp: 12   SpO2: 95%   Weight: 122 kg (268 lb)   Height: 165.1 cm (65\")    Body mass index is 44.6 kg/m².   HEAD: atraumatic, normocephalic   THROAT: tonsils are surgically absent, Mallampati class III  NECK:  , trachea is midline  RESPIRATORY SYSTEM: Respirations even, unlabored, normal rate  CARDIOVASULAR SYSTEM: Normal rate  NEUROLOGICAL SYSTEM: Alert and oriented x 3  PSYCHIATRIC SYSTEM: Mood is normal/ appropriate     Office note(s) from care team reviewed. Office note(s) reviewed: 2/7/2024 PCP note    Labs/ Test Results Reviewed:      2005 sleep study, 2005 titration study, 2016 titration study          ASSESSMENT AND PLAN:   Obstructive sleep apnea: patient's symptoms and physical examination are concerning for possible sleep apnea.   I discussed the signs, symptoms, and pathophysiology of sleep apnea with this patient.  I also discussed the possible complications of untreated sleep apnea including but not limited to " potential risk of resistant hypertension, insulin resistance, pulmonary hypertension, atrial fibrillation or other arrhythmias, heart attack, stroke, nonrestorative sleep with hypersomnia which can increase risk for motor vehicle accidents, etc.   Different testing methods including home-based and lab based sleep studies were discussed with this patient.   Based on patient history and physical examination, will proceed with home sleep study.  The order for the sleep study is placed in [facility].  The test will be scheduled after prior authorization has been obtained through patient's insurance.  Discussed overview of treatment options for sleep apnea in the office today including PAP therapy, and treatment/ management will be discussed in more detail with this patient after the test is completed.  All questions were answered to patient's satisfaction.   Snoring: snoring is the sound created by turbulent airflow vibrating upper airway soft tissue.  I have also discussed factors affecting snoring including sleep deprivation, sleeping on the back and alcohol ingestion. To minimize snoring, patient is advised to have adequate sleep, sleep on their side, and avoid alcohol and sedative medications around bedtime.   Inefficient sleep habits/irregular sleep schedule: She has nonrestorative sleep and daytime fatigue.  Ends up napping during the day, sometimes for prolonged periods of time.  Then may have trouble sleeping at night.  We discussed trying to cut back on daytime naps or taking a nap earlier in the day and trying to limit amount of time she is napping when possible.  Hopefully this will help her sleep better at night.  Also recommend addressing sleep apnea.  Excessive daytime sleepiness: [place] Sleepiness Scale of Total score: 22.  There are many causes of excessive daytime sleepiness.  Rule out sleep apnea as a contributing factor, as above.  Do not drive, operate heavy machinery, or do activities that require high  concentration if feeling tired/drowsy.  Obesity: Body mass index is 44.6 kg/m².. Patients who are overweight or obese are at increased risk of sleep apnea/ sleep disordered breathing. Weight reduction and healthy lifestyle are encouraged in overweight/ obese patients as part of a comprehensive approach to sleep apnea treatment.    Heart disease  Hypertension  Diabetes  Hyperlipidemia  Depression  Neuropathy  Chronic lumbar and cervical spine issues w/ hx surgeries    I have also discussed with the patient the following  Sleep hygiene: try to maintain a regular bed time and wake time, avoid watching TV/ using electronic devices in bed (including cell phones), limit caffeinated and alcoholic beverages before bed, try to maintain a cool and quiet sleep environment, avoid daytime naps  Adequate amount of sleep: most people need around 7 to 8 hours of sleep each night        Patient will follow-up after study, 31 to 90 days after PAP therapy initiated if applicable, or contact the office sooner for questions or concerns. Patient's questions were answered.            Thank you again for asking me to consult on this patient.  Please do not hesitate to call me if you have additional questions or concerns.       Melany Abbasi DNP, APRN  [facility] Sleep Medicine

## 2024-04-18 ENCOUNTER — TELEPHONE (OUTPATIENT)
Dept: SLEEP MEDICINE | Facility: CLINIC | Age: 69
End: 2024-04-18
Payer: COMMERCIAL

## 2024-04-18 DIAGNOSIS — G47.19 EXCESSIVE DAYTIME SLEEPINESS: ICD-10-CM

## 2024-04-18 DIAGNOSIS — E66.01 SEVERE OBESITY (BMI >= 40): ICD-10-CM

## 2024-04-18 DIAGNOSIS — G47.33 OBSTRUCTIVE SLEEP APNEA, ADULT: Primary | ICD-10-CM

## 2024-04-18 NOTE — TELEPHONE ENCOUNTER
Called and spoke with patient.  Insurance not wanting to cover another baseline study given known history of sleep apnea.  She does not currently have a CPAP.  Sleep apnea has been untreated for several years.  She had a hard time tolerating CPAP in the past.  She still has severe obesity with BMI over 40.  Still with symptoms of sleep apnea.  Weight up from previous 2016 titration study at which point sleep apnea still seen.  At this point we will proceed with titration study to get her requalified for device and see if she may tolerate BiPAP better if fails CPAP titration.

## 2024-07-30 ENCOUNTER — HOSPITAL ENCOUNTER (OUTPATIENT)
Dept: SLEEP MEDICINE | Facility: HOSPITAL | Age: 69
Discharge: HOME | End: 2024-07-30
Admitting: NURSE PRACTITIONER
Payer: COMMERCIAL

## 2024-07-30 VITALS — BODY MASS INDEX: 44.81 KG/M2 | WEIGHT: 268.96 LBS | HEIGHT: 65 IN

## 2024-07-30 DIAGNOSIS — E66.01 SEVERE OBESITY (BMI >= 40): ICD-10-CM

## 2024-07-30 DIAGNOSIS — G47.19 EXCESSIVE DAYTIME SLEEPINESS: ICD-10-CM

## 2024-07-30 DIAGNOSIS — G47.33 OBSTRUCTIVE SLEEP APNEA, ADULT: ICD-10-CM

## 2024-07-30 PROCEDURE — 95811 POLYSOM 6/>YRS CPAP 4/> PARM: CPT

## 2024-07-31 DIAGNOSIS — G47.33 OBSTRUCTIVE SLEEP APNEA, ADULT: Primary | ICD-10-CM

## 2024-07-31 DIAGNOSIS — E66.01 SEVERE OBESITY (BMI >= 40): ICD-10-CM

## 2024-07-31 PROCEDURE — 95811 POLYSOM 6/>YRS CPAP 4/> PARM: CPT | Performed by: INTERNAL MEDICINE

## 2024-11-07 ENCOUNTER — OFFICE VISIT (OUTPATIENT)
Dept: SLEEP MEDICINE | Facility: CLINIC | Age: 69
End: 2024-11-07
Payer: MEDICARE

## 2024-11-07 VITALS
WEIGHT: 265 LBS | DIASTOLIC BLOOD PRESSURE: 52 MMHG | BODY MASS INDEX: 44.15 KG/M2 | HEIGHT: 65 IN | HEART RATE: 78 BPM | SYSTOLIC BLOOD PRESSURE: 121 MMHG | OXYGEN SATURATION: 98 %

## 2024-11-07 DIAGNOSIS — G47.19 EXCESSIVE DAYTIME SLEEPINESS: ICD-10-CM

## 2024-11-07 DIAGNOSIS — G47.33 OBSTRUCTIVE SLEEP APNEA, ADULT: Primary | ICD-10-CM

## 2024-11-07 DIAGNOSIS — E66.01 SEVERE OBESITY (BMI >= 40): ICD-10-CM

## 2024-11-07 PROCEDURE — G0463 HOSPITAL OUTPT CLINIC VISIT: HCPCS

## 2024-11-07 NOTE — PROGRESS NOTES
[address] [address]  Phone   Fax         SLEEP CLINIC FOLLOW-UP PROGRESS NOTE    Nohemi Mesa  [id number]   1955  69 y.o.  female      PCP: Corrine Orantes MD    DATE OF VISIT: 11/7/2024          CHIEF COMPLAINT: Obstructive sleep apnea    HPI:  This is a 69 y.o. year old patient who presents to the clinic today for the management of obstructive sleep apnea.  Patient established care with our office 3/2024.  She had a sleep study in 2005 showing AHI of 20.9/h at that point.  We were able to actually get a copy of the study and it looks like her weight was noted to be 288 pounds at that time.  She underwent a titration study 7/2005 and was titrated to CPAP at set pressure of 9 cm H2O.  She had another titration study 3/2016 at which point her weight was noted to be 263 pounds and auto CPAP at 9 to 17 cm H2O was recommended.  When she came into the office 3/2024 she continued to have symptoms concerning for sleep apnea.  She reported trouble tolerating a CPAP in the past and reported she had had trouble meeting insurance compliance requirements in the past for usage due to poor tolerance.  Home sleep study was recommended to reestablish diagnosis.  Insurance declined to cover another baseline study as she had known history of sleep apnea with continued symptoms.  As she had had trouble tolerating CPAP in the past it was recommended that she do titration study to see if she would tolerate CPAP or if she may need BiPAP for better tolerance.  She ended up being titrated to BiPAP at 14/10 cm H2O with EPR of 2.  She presents today for follow-up.  She reports she initially had a really hard time finding a mask that would fit.  She reports she tried several different facemask styles.  She reports she got a fullface mask about a week ago and she finally feels like she has a good mask fit.  She reports she really was not able to use  "the CPAP all night until she got this full facemask about a week ago.  Unfortunately the [employer] my air system is down today so I do not have device data to review.  I am going to try to get the report again later today or tomorrow morning and will make an addendum to the office note once I have received this.  DME company does not have a report that they can send us either at this point.  She does feel she is tolerating the BiPAP a lot better than she did the CPAP in the past.  Denies issues tolerating the pressures.  She does continue to have excessive daytime sleepiness.  She reports she tries to get about 6 to 7 hours of sleep per night.          MEDICATIONS: reviewed     ALLERGIES:  Patient has no known allergies.    SOCIAL HISTORY (habits pertaining to sleep medicine):  Tobacco use: No   Alcohol use: 0 per week  Caffeine use: 1     REVIEW OF SYSTEMS:   Pertinent positive symptoms are:  [place] Sleepiness Scale :Total score: 21   Heartburn  Depression: Follows with outside provider.  Per PCP note, looks like she is on Wellbutrin and sertraline for this.        PHYSICAL EXAMINATION:  CONSTITUTIONAL:  Vitals:    11/07/24 1300   BP: 121/52   BP Location: Left arm   Patient Position: Sitting   Pulse: 78   SpO2: 98%   Weight: 120 kg (265 lb)   Height: 165.1 cm (65\")    Body mass index is 44.1 kg/m².   HEAD: atraumatic, normocephalic  RESP SYSTEM: not in respiratory distress, breathing unlabored  CARDIOVASULAR: normal rate, no edema noted   NEURO: Alert and oriented x 3, mood and affect appeared appropriate      DATA REVIEWED:  None available to review today with system being down.  Will make addendum to report once we have received download.    ADDENDUM:  DATA REVIEWED:   The PAP compliance summary downloaded on 11/6/2024 has been reviewed independently by me and discussed with the patient.   Compliance: 93%  More than 4 hr use: 57%  Average use of the device: 4 hours 9 minutes per night on days used  Residual AHI: " 1.4 /hr (goal < 5.0 /hr)  Device: Air curve 11V auto          ASSESSMENT AND PLAN:  Obstructive Sleep Apnea: Patient feels she is tolerating BiPAP much better than she tolerated CPAP in the past.  No complaints about pressures.  She feels she was finally able to find a facemask that she tolerates well and does not have leakage with, this is a fullface mask.  She reports she just got this about a week ago.  Will make addendum to note once we have received device download so can review how well treated her sleep apnea is.  We did discuss that it may take longer using the PAP device before she notices improvement in symptoms.  Would continue to try to use PAP device all night every night/whenever sleeping.  Will follow-up in the office in about 4 to 6 weeks when she has been using this more regularly longer where she will contact the office sooner for issues or concerns. ADDENDUM: AHI well-controlled with use of device.  Patient reports she recently got mask that is fitting better with decreased leak.  Continue to increase usage as advised during office visit.  Keep follow-up as scheduled.  Excessive daytime sleepiness: Will get copy of PAP report, as above, to determine how well sleep apnea is treated.  Patient reports she has really only been able to use the PAP for about a week now since she has gotten the fullface mask which is having less leakage than the other masks she had tried.  Recommended continuing to try to use PAP device all night every night/whenever sleeping.  Patient advised that most people need 7 to 9 hours of sleep per night, would also try to ensure adequate amount of nighttime sleep.  Advised patient not to drive or operate heavy machinery or do any activities that require high concentration of feeling tired or drowsy, discussed risks.  She also reports her blood sugars have not been well-controlled recently, she is following with outside provider on this.  We did discuss that daytime  fatigue/sleepiness could be multifactorial and would recommend trying to work on getting blood sugars well-controlled with managing provider.  Severe Obesity: Body mass index is 44.1 kg/m².. Patients who are overweight or obese are at increased risk of sleep apnea/ sleep disordered breathing. Weight reduction and healthy lifestyle are encouraged in overweight/ obese patients as part of a comprehensive approach to sleep apnea treatment.        Patient will follow-up in 4 to 6 weeks or follow-up sooner for any issues or concerns.  Patient's questions were answered.        Thank you for allowing me to participate in the care of this patient.     Melany Abbasi DNP, APRN  [facility] Sleep Medicine

## 2025-01-09 ENCOUNTER — OFFICE VISIT (OUTPATIENT)
Dept: SLEEP MEDICINE | Facility: CLINIC | Age: 70
End: 2025-01-09
Payer: MEDICARE

## 2025-01-09 VITALS
OXYGEN SATURATION: 93 % | SYSTOLIC BLOOD PRESSURE: 179 MMHG | HEART RATE: 86 BPM | DIASTOLIC BLOOD PRESSURE: 76 MMHG | WEIGHT: 260 LBS | BODY MASS INDEX: 43.32 KG/M2 | HEIGHT: 65 IN

## 2025-01-09 DIAGNOSIS — G47.33 OBSTRUCTIVE SLEEP APNEA, ADULT: Primary | ICD-10-CM

## 2025-01-09 DIAGNOSIS — E66.01 SEVERE OBESITY (BMI >= 40): ICD-10-CM

## 2025-01-09 PROCEDURE — G0463 HOSPITAL OUTPT CLINIC VISIT: HCPCS
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
## 2025-03-27 ENCOUNTER — OFFICE VISIT (OUTPATIENT)
Dept: SLEEP MEDICINE | Facility: CLINIC | Age: 70
End: 2025-03-27
Payer: MEDICARE

## 2025-03-27 VITALS
HEIGHT: 65 IN | HEART RATE: 70 BPM | WEIGHT: 265 LBS | OXYGEN SATURATION: 96 % | SYSTOLIC BLOOD PRESSURE: 144 MMHG | BODY MASS INDEX: 44.15 KG/M2 | DIASTOLIC BLOOD PRESSURE: 60 MMHG

## 2025-03-27 DIAGNOSIS — G47.33 OBSTRUCTIVE SLEEP APNEA, ADULT: Primary | ICD-10-CM

## 2025-03-27 DIAGNOSIS — R53.82 CHRONIC FATIGUE: ICD-10-CM

## 2025-03-27 DIAGNOSIS — E66.01 SEVERE OBESITY (BMI >= 40): ICD-10-CM

## 2025-03-27 DIAGNOSIS — G47.23 IRREGULAR SLEEP-WAKE RHYTHM: ICD-10-CM

## 2025-03-27 PROCEDURE — 3077F SYST BP >= 140 MM HG: CPT | Performed by: NURSE PRACTITIONER

## 2025-03-27 PROCEDURE — G0463 HOSPITAL OUTPT CLINIC VISIT: HCPCS

## 2025-03-27 PROCEDURE — 1160F RVW MEDS BY RX/DR IN RCRD: CPT | Performed by: NURSE PRACTITIONER

## 2025-03-27 PROCEDURE — 1159F MED LIST DOCD IN RCRD: CPT | Performed by: NURSE PRACTITIONER

## 2025-03-27 PROCEDURE — 3078F DIAST BP <80 MM HG: CPT | Performed by: NURSE PRACTITIONER

## 2025-03-27 PROCEDURE — 99214 OFFICE O/P EST MOD 30 MIN: CPT | Performed by: NURSE PRACTITIONER

## 2025-03-27 NOTE — PROGRESS NOTES
[address] [address]  Phone   Fax         SLEEP CLINIC FOLLOW-UP PROGRESS NOTE    Nohemi Mesa  [id number]   1955  69 y.o.  female      PCP: Corrine Orantes MD    DATE OF VISIT: 3/27/2025          CHIEF COMPLAINT: Obstructive sleep apnea, irregular sleep/wake schedule, daytime fatigue    HPI:  This is a 69 y.o. year old patient who presents to the clinic today for the management of obstructive sleep apnea.  She had a sleep study in 2005 showing AHI of 20.9/h at that point.  We were able to actually get a copy of the study and it looks like her weight was noted to be 288 pounds at that time.  She underwent a titration study 7/2005 and was titrated to CPAP at set pressure of 9 cm H2O.  She had another titration study 3/2016 at which point her weight was noted to be 263 pounds and auto CPAP at 9 to 17 cm H2O was recommended.  When she came into the office 3/2024 she continued to have symptoms concerning for sleep apnea.  She reported trouble tolerating a CPAP in the past and reported she had had trouble meeting insurance compliance requirements in the past for usage due to poor tolerance.  Home sleep study was recommended to reestablish diagnosis.  Insurance declined to cover another baseline study as she had known history of sleep apnea with continued symptoms and continued presence of severe obesity.  As she had had trouble tolerating CPAP in the past it was recommended that she do titration study to see if she would tolerate CPAP or if she may need BiPAP for better tolerance.  She ended up being titrated to BiPAP at 14/10 cm H2O with EPR of 2.  Patient's sleep apnea has improved with this therapy with residual AHI 1.2/hr.   Average usage is 5 hr 15 minutes per night on nights used.   Patient tolerating device well and improved with treatment.  Mask leak seems to be improving.     Patient did have some nights where she did  "not use CPAP.  She reports she stayed up all night cleaning those nights.  Sometimes reports she does not feel sleepy at night.  Believes she took long naps those days.  Has not been using CPAP with napping.  We did discuss that the more she naps during the day the harder it may be to sleep at night.  We discussed possible circadian rhythm disturbance.  Recommended trying to do activities such as housecleaning during the day if possible to aid in nighttime sleep.  Avoid sleep deprivation but try to avoid long afternoon naps if possible.  If she does have to nap then did recommend using the CPAP with napping as well and trying to nap as early in the day as possible and avoiding long afternoon naps.  She reports she has labs through primary care provider tomorrow and follow-up with primary care provider next week.  Recommended continuing to increase usage of PAP device.  Will see if we get a copy of labs next week through primary care to review.        MEDICATIONS: reviewed     ALLERGIES:  Patient has no known allergies.    SOCIAL HISTORY (habits pertaining to sleep medicine):  Tobacco use: No   Alcohol use: 0 per week  Caffeine use: 1-2     REVIEW OF SYSTEMS:   Pertinent positive symptoms are:  [place] Sleepiness Scale :Total score: 18   Heartburn  Dry mouth/nose        PHYSICAL EXAMINATION:  CONSTITUTIONAL:  Vitals:    03/27/25 1300   BP: 144/60   BP Location: Left arm   Patient Position: Sitting   Pulse: 70   SpO2: 96%   Weight: 120 kg (265 lb)   Height: 165.1 cm (65\")    Body mass index is 44.1 kg/m².   HEAD: atraumatic, normocephalic  RESP SYSTEM: not in respiratory distress, breathing unlabored  CARDIOVASULAR: normal rate, no edema noted   NEURO: Alert and oriented x 3, mood and affect appeared appropriate      DATA REVIEWED:  The PAP compliance summary downloaded on 3/26/25 has been reviewed independently by me and discussed with the patient.   Compliance: 77%  More than 4 hr use: 63%  Average use of the " device: 5 hr 15 min per night  Residual AHI: 1.2/hr (goal < 5.0 /hr)  Device: ResMed AirCurve 11  DME: [facility]      ADDENDUM: Received copy of labs from 3/28/2025.  A1c 11.2%. Defer to ordering/managing provider.        ASSESSMENT AND PLAN:  Obstructive Sleep Apnea: sleep apnea has improved with the device and treatment.   I have personally reviewed the smart card download and discussed the download data with the patient and encouarged continued use of the device.  The residual AHI is acceptable. The device is benefiting the patient and the device is medically necessary.  Recommend patient get supplies from the [employer] company, change them on a regular basis, and clean as directed.  A prescription for supplies has been sent to the [employer] company.  Recommend increasing usage of PAP device, most insurances require minimum usage of 4 hours per night at least 70% of the time, would recommend using all night every night if possible for optimum health.  Also recommend using during any napping she may do. Recommend prioritizing sleep to aid in overall health.  Do not drive or operate heavy machinery or do activities that require high concentration if feeling tired or drowsy.  Severe Obesity: Body mass index is 44.1 kg/m².. Patients who are overweight or obese are at increased risk of sleep apnea/ sleep disordered breathing. Weight reduction and healthy lifestyle are encouraged in overweight/ obese patients as part of a comprehensive approach to sleep apnea treatment.    Irregular sleep-wake schedule with daytime fatigue: recommend trying to avoid long daytime napping to aid in nighttime sleep.  Patient reports upcoming appointment with counselor.  Consider referral to look into CBT-I candidacy if unable to improve sleep schedule on her own.  Do not drive or operate heavy machinery if feeling tired or drowsy.  We also discussed that fatigue can be multifactorial.  Recommend discussing further with PCP and other  specialists.  She does report she has upcoming labs scheduled through PCP.      Patient will follow-up in 6-8 weeks or follow-up sooner for any issues or concerns.  Patient's questions were answered.          Thank you for allowing me to participate in the care of this patient.     Melany Abbasi DNP, APRN  [facility]

## 2025-05-15 ENCOUNTER — OFFICE VISIT (OUTPATIENT)
Dept: SLEEP MEDICINE | Facility: CLINIC | Age: 70
End: 2025-05-15
Payer: MEDICARE

## 2025-05-15 VITALS
SYSTOLIC BLOOD PRESSURE: 126 MMHG | DIASTOLIC BLOOD PRESSURE: 55 MMHG | WEIGHT: 262 LBS | HEIGHT: 65 IN | OXYGEN SATURATION: 95 % | BODY MASS INDEX: 43.65 KG/M2 | HEART RATE: 76 BPM

## 2025-05-15 DIAGNOSIS — E66.01 SEVERE OBESITY (BMI >= 40): ICD-10-CM

## 2025-05-15 DIAGNOSIS — G47.23 IRREGULAR SLEEP-WAKE RHYTHM: ICD-10-CM

## 2025-05-15 DIAGNOSIS — G47.33 OBSTRUCTIVE SLEEP APNEA, ADULT: Primary | ICD-10-CM

## 2025-05-15 PROCEDURE — G0463 HOSPITAL OUTPT CLINIC VISIT: HCPCS

## 2025-05-15 RX ORDER — SEMAGLUTIDE 0.68 MG/ML
INJECTION, SOLUTION SUBCUTANEOUS
COMMUNITY
Start: 2025-04-18

## 2025-05-15 RX ORDER — MIRABEGRON 25 MG/1
TABLET, FILM COATED, EXTENDED RELEASE ORAL
COMMUNITY
Start: 2025-03-13

## 2025-05-15 NOTE — PROGRESS NOTES
[address] [address]  Phone   Fax        SLEEP CLINIC FOLLOW-UP PROGRESS NOTE    Nohemi Mesa  [id number]   1955  69 y.o.  female      PCP: Corrine Orantes MD    DATE OF VISIT: 5/15/2025          CHIEF COMPLAINT: Obstructive sleep apnea, irregular sleep/wake schedule    HPI:  This is a 69 y.o. year old patient who presents to the clinic today for the management of obstructive sleep apnea.   She had a sleep study in 2005 showing AHI of 20.9/h at that point.  We were able to actually get a copy of the study and it looks like her weight was noted to be 288 pounds at that time.  She underwent a titration study 7/2005 and was titrated to CPAP at set pressure of 9 cm H2O.  She had another titration study 3/2016 at which point her weight was noted to be 263 pounds and auto CPAP at 9 to 17 cm H2O was recommended.  When she came into the office 3/2024 she continued to have symptoms concerning for sleep apnea.  She reported trouble tolerating a CPAP in the past and reported she had had trouble meeting insurance compliance requirements in the past for usage due to poor tolerance.  Home sleep study was recommended to reestablish diagnosis.  Insurance declined to cover another baseline study as she had known history of sleep apnea with continued symptoms and continued presence of severe obesity.  As she had had trouble tolerating CPAP in the past it was recommended that she do titration study to see if she would tolerate CPAP or if she may need BiPAP for better tolerance.  She ended up being titrated to BiPAP at 14/10 cm H2O with EPR of 2.      Patient's sleep apnea has improved with this therapy with residual AHI 1.6/hr.   Average usage is 4 hours 30 minutes per night on nights used.  Patient reports she is tolerating PAP device well.  Denies issues with pressures.  Her overall PAP usage for the last 30 days as of 5/12/2025 download was  "80%, however greater than/equal to 4-hour usage only at 50%.  Patient continues to have fatigue in the afternoon when she gets home from work.  She has no trouble staying awake at work as she is active, usually peeling apples.  When she gets home and sits down she feels tired and may doze off.  She may plan to nap for an hour but may end up napping 2 to 3 hours.  Does not usually have CPAP on during this time.  Then she may have trouble falling and staying asleep at night, might be getting 6-1/2 hours of sleep on a good night, other nights might be getting 5 or less.  Her chronic pain makes it hard to sleep at night.  She reports she has a history of low back pain with sciatica pain down both sides and also reports a history of neuropathy.  She reports she has an upcoming appointment with neurology and will discuss further at that time.          MEDICATIONS: reviewed     ALLERGIES:  Patient has no known allergies.    SOCIAL HISTORY (habits pertaining to sleep medicine):  Alcohol use: 0 per week  Caffeine use: 2     REVIEW OF SYSTEMS:   Pertinent positive symptoms are:  [place] Sleepiness Scale :Total score: 11   Dry mouth/nose  Acid reflux  Depression        PHYSICAL EXAMINATION:  CONSTITUTIONAL:  Vitals:    05/15/25 0900   BP: 126/55   BP Location: Left arm   Patient Position: Sitting   Pulse: 76   SpO2: 95%   Weight: 119 kg (262 lb)   Height: 165.1 cm (65\")    Body mass index is 43.6 kg/m².   HEAD: atraumatic, normocephalic  RESP SYSTEM: not in respiratory distress, breathing unlabored  CARDIOVASULAR: normal rate, no edema noted   NEURO: Alert and oriented x 3, mood and affect appeared appropriate      DATA REVIEWED:  The PAP compliance summary downloaded on 5/12/2025 has been reviewed independently by me and discussed with the patient.   Compliance: 80%  More than 4 hr use: 50%  Average use of the device: 4 hours 30 minutes per night  Residual AHI: 1.6 /hr (goal < 5.0 /hr)  Device: ResMed air curve 11 BiPAP  Mask " type: Fullface  DME: [employer]          ASSESSMENT AND PLAN:  Obstructive Sleep Apnea: sleep apnea has improved with the device and treatment.  I have personally reviewed the smart card download and discussed the download data with the patient and encouarged continued use of the device.  The residual AHI is acceptable. The device is benefiting the patient and the device is medically necessary.  Recommend patient get supplies from the DME company, change them on a regular basis, and clean as directed.  A prescription for supplies has been sent to the [employer] company.  Recommend increasing usage of PAP device, most insurances require minimum usage of 4 hours per night at least 70% of the time, would recommend using all night every night if possible for optimum health.  Recommend prioritizing sleep to aid in overall health.  Do not drive or operate heavy machinery or do activities that require high concentration if feeling tired or drowsy.  Severe Obesity: Body mass index is 43.6 kg/m².. Patients who are overweight or obese are at increased risk of sleep apnea/ sleep disordered breathing. Weight reduction and healthy lifestyle are encouraged in overweight/ obese patients as part of a comprehensive approach to sleep apnea treatment.    Irregular sleep-wake schedule: Reviewed with patient again that the more she is napping during the day the harder it might be to fall and stay asleep at night.  Discussed that most people need 7 to 9 hours of sleep per night for adequate health and daytime energy levels.  Patient also advised again to use PAP whenever sleeping, even with naps.  Patient was advised not to drive or operate heavy machinery or do activities that require high concentration if feeling tired or drowsy.  She denies issues staying awake at work but when she gets home she may doze off she sits down and watches TV.  Then she may wake back up and be up for a while taking care of her cats before she is able to get back into  bed and going back to sleep.  Taking 2 to 3-hour naps at times in the afternoon.  We did discuss possibly having her take care of her cats when she gets home from work to help delay napping and then maybe getting to bed earlier to see if this may help give her longer stretches of sleep.  Denies history of bipolar disorder.  Recommended referral to sleep psychologist to see if they may be able to help get into better sleep habits, and see if she may be a candidate for CBT-I.  May also be able to help with some nonpharmacologic strategies to further help deal with her chronic pain, as she does feel her chronic pain is affecting her sleep.  Patient declines referral at this time. Reports she has recently started seeing a counselor.  Reports she has an upcoming appointment with neurology to further address her neuropathy, recommended discussing her sciatica symptoms at that time as well.        Patient will follow-up in 6 to 8 weeks, per patient preference, or follow-up sooner for any issues or concerns.  Patient's questions were answered.          Thank you for allowing me to participate in the care of this patient.     Melany Abbasi DNP, APRN  [facility]

## 2025-07-17 ENCOUNTER — OFFICE VISIT (OUTPATIENT)
Dept: SLEEP MEDICINE | Facility: CLINIC | Age: 70
End: 2025-07-17
Payer: MEDICARE

## 2025-07-17 VITALS
WEIGHT: 256 LBS | BODY MASS INDEX: 42.65 KG/M2 | SYSTOLIC BLOOD PRESSURE: 122 MMHG | OXYGEN SATURATION: 95 % | DIASTOLIC BLOOD PRESSURE: 50 MMHG | HEIGHT: 65 IN | HEART RATE: 65 BPM

## 2025-07-17 DIAGNOSIS — G47.19 EXCESSIVE DAYTIME SLEEPINESS: ICD-10-CM

## 2025-07-17 DIAGNOSIS — G47.33 OBSTRUCTIVE SLEEP APNEA, ADULT: ICD-10-CM

## 2025-07-17 DIAGNOSIS — Z72.821 INADEQUATE SLEEP HYGIENE: Primary | ICD-10-CM

## 2025-07-17 PROCEDURE — G0463 HOSPITAL OUTPT CLINIC VISIT: HCPCS

## 2025-07-17 NOTE — PROGRESS NOTES
[address] [address]  Phone   Fax         SLEEP CLINIC FOLLOW-UP PROGRESS NOTE    Nohemi Mesa  [phone]   1955  69 y.o.  female      PCP: Corrine Orantes MD    DATE OF VISIT: 7/17/2025          CHIEF COMPLAINT: Obstructive sleep apnea, insufficient nighttime sleep    HPI:  This is a 69 y.o. year old patient who presents to the clinic today for the management of obstructive sleep apnea. She had a sleep study in 2005 showing AHI of 20.9/h at that point. We were able to actually get a copy of the study and it looks like her weight was noted to be 288 pounds at that time. She underwent a titration study 7/2005 and was titrated to CPAP at set pressure of 9 cm H2O. She had another titration study 3/2016 at which point her weight was noted to be 263 pounds and auto CPAP at 9 to 17 cm H2O was recommended. When she came into the office 3/2024 she continued to have symptoms concerning for sleep apnea. She reported trouble tolerating a CPAP in the past and reported she had had trouble meeting insurance compliance requirements in the past for usage due to poor tolerance. Home sleep study was recommended to reestablish diagnosis. Insurance declined to cover another baseline study as she had known history of sleep apnea with continued symptoms and continued presence of severe obesity. As she had had trouble tolerating CPAP in the past it was recommended that she do titration study to see if she would tolerate CPAP or if she may need BiPAP for better tolerance. She ended up being titrated to BiPAP at 14/10 cm H2O with EPR of 2.     Patient last seen in the office 5/2025 at which point she was having irregular sleep-wake schedule with time sleeping at night and lots of napping during the day.  We discussed healthy sleep habits in detail and handout been provided.  Patient denies history of bipolar disorder.  We had discussed referral to  "sleep psychologist to look into candidacy for CBT-I.  We discussed that they might also have some techniques to help with chronic pain as patient also felt that this was affecting her sleep.  Also recommended continuing to follow with outside providers on her other symptoms.  Patient declined referral to CBT-I at that point.    Patient presents today for follow-up.  Device download as of 7/8/2025 shows that patient's sleep apnea has improved with this therapy with residual AHI 2.6/hr.   Average usage is 3 hours 58 minutes per night on nights used.   Overall usage on device download is 90%, greater than/equal to 4-hour usage is at 53%.   Patient thought she was using device more than this.  Believes she is getting 6 to 7 hours asleep per night though some nights might be 5.  Still napping during the day.  Still sleeping the TV on at night.  Reports she has upcoming stress test scheduled through cardiology.          MEDICATIONS: reviewed     ALLERGIES:  Patient has no known allergies.    SOCIAL HISTORY (habits pertaining to sleep medicine):  Tobacco use: No   Alcohol use: 0 per week  Caffeine use: 0     REVIEW OF SYSTEMS:   Pertinent positive symptoms are:  [place] Sleepiness Scale :Total score: 15   Reflux: Following with outside provider        PHYSICAL EXAMINATION:  CONSTITUTIONAL:  Vitals:    07/17/25 1100   BP: 122/50   BP Location: Left arm   Patient Position: Sitting   Pulse: 65   SpO2: 95%   Weight: 116 kg (256 lb)   Height: 165.1 cm (65\")    Body mass index is 42.6 kg/m².   HEAD: atraumatic, normocephalic  RESP SYSTEM: not in respiratory distress, breathing unlabored  CARDIOVASULAR: normal rate, no edema noted   NEURO: Alert and oriented x 3, mood and affect appeared appropriate      DATA REVIEWED:  The PAP compliance summary downloaded on 7/8/2025 has been reviewed independently by me and discussed with the patient.   Compliance: 90%  More than 4 hr use: 53%  Average use of the device: 3 hours 58-minute per " night  Residual AHI: 2.6/hr (goal < 5.0 /hr)  Device: ResMed air curve 11V auto  Mask type: Fullface  DME: Rosie Tapia          ASSESSMENT AND PLAN:  Obstructive Sleep Apnea: sleep apnea has improved with the device and treatment.  I have personally reviewed the smart card download and discussed the download data with the patient and encouarged continued use of the device.  The residual AHI is acceptable. The device is benefiting the patient and the device is medically necessary.  Recommend patient get supplies from the [employer], change them on a regular basis, and clean as directed.  A prescription for supplies has been sent to the [employer].  Reviewed risks of untreated or poorly treated sleep apnea including cardiopulmonary and cerebrovascular risks.  Recommend increasing usage of PAP device, most insurances require minimum usage of 4 hours per night at least 70% of the time, would recommend using all night every night if possible for optimum health.  Recommend prioritizing sleep to aid in overall health.  Do not drive or operate heavy machinery or do activities that require high concentration if feeling tired or drowsy.  Recommended increasing use of Pap device.  Discussed putting on 15 minutes before falling asleep if possible to help prevent taken off at night.  Recommended using PAP device whenever sleeping, including with any naps.  Declines mask fitting at this point, feels that current leakage is likely due to not changing out interface recently.  Discussed importance of cleaning and changing out supplies as recommended.  Patient verbalized understanding.  Severe Obesity: Body mass index is 42.6 kg/m².. Patients who are overweight or obese are at increased risk of sleep apnea/ sleep disordered breathing. Weight reduction and healthy lifestyle are encouraged in overweight/ obese patients as part of a comprehensive approach to sleep apnea treatment.     Insufficient nighttime sleep, excessive  daytime sleepiness: Patient does continue to nap during the day.  However she reports she has been able to cut back on caffeine and sometimes if she is sitting around for a long period of time and then gets up and moves around she is able to notice an improvement in her energy levels without feeling like she needs a nap.  Not currently using PAP device when napping.  Thinks she might be getting 6 to 7 hours of sleep per night, maybe 5 at times, however only using Pap device 3 hours 58 minutes on average per night.  Recommend using PAP device whenever sleeping, including with naps.  We discussed again healthy sleep habits, we discussed trying to cut back on naptime to aid in overnight sleep.  She denies trouble falling asleep at this point, but gets distracted in the evening and might go to bed too late.  Overall her sleep hygiene still sounds inadequate.  Patient would be amenable to seeing sleep psychologist if they are able to see her in [place] or possibly do a telehealth visit.  Order placed for [facility] psychology as they do have some providers license in [place] with her practice.  Patient aware not to drive or operate heavy machinery if feeling tired or drowsy, discussed risks      Patient will follow-up in 6 months or follow-up sooner for any issues or concerns.  Patient's questions were answered.          Thank you for allowing me to participate in the care of this patient.     Melany Abbasi DNP, APRN  [facility]

## (undated) DEVICE — FLTR ULPA W/FLD TRAP

## (undated) DEVICE — ENDOPATH XCEL DILATING TIP TROCARS WITH STABILITY SLEEVES: Brand: ENDOPATH XCEL

## (undated) DEVICE — GENERAL LAPAROSCOPY CDS: Brand: MEDLINE INDUSTRIES, INC.

## (undated) DEVICE — KT SURG TURNOVER 050

## (undated) DEVICE — ENDOPATH XCEL WITH OPTIVIEW TECHNOLOGY UNIVERSAL TROCAR STABILITY SLEEVES: Brand: ENDOPATH XCEL OPTIVIEW

## (undated) DEVICE — PENCL EVAC ULTRAVAC SMOKE W/BLD

## (undated) DEVICE — SOL LACTATED RINGER 1000ML

## (undated) DEVICE — SLV SCD CALF HEMOFORCE DVT THERP REPROC MD

## (undated) DEVICE — UNDYED BRAIDED (POLYGLACTIN 910), SYNTHETIC ABSORBABLE SUTURE: Brand: COATED VICRYL

## (undated) DEVICE — LAPAROSCOPIC GAS CONDITIONING DEVICE.: Brand: INSUFLOW

## (undated) DEVICE — SOL IRRIG H2O 1000ML STRL

## (undated) DEVICE — ENDOPATH 5MM CURVED SCISSORS WITH MONOPOLAR CAUTERY: Brand: ENDOPATH

## (undated) DEVICE — ENDOPATH PNEUMONEEDLE INSUFFLATION NEEDLES WITH LUER LOCK CONNECTORS 120MM: Brand: ENDOPATH

## (undated) DEVICE — SUT VIC 0 SUTUPAK TIES 18IN J906G

## (undated) DEVICE — GLV SURG BIOGEL SENSR LTX PF SZ7.5

## (undated) DEVICE — DRSNG SURESITE WNDW 2.38X2.75

## (undated) DEVICE — ADHS LIQ MASTISOL 2/3ML

## (undated) DEVICE — SYR LL TP 10ML STRL